# Patient Record
Sex: FEMALE | Race: WHITE | NOT HISPANIC OR LATINO
[De-identification: names, ages, dates, MRNs, and addresses within clinical notes are randomized per-mention and may not be internally consistent; named-entity substitution may affect disease eponyms.]

---

## 2017-09-05 ENCOUNTER — RESULT REVIEW (OUTPATIENT)
Age: 22
End: 2017-09-05

## 2018-04-24 ENCOUNTER — TRANSCRIPTION ENCOUNTER (OUTPATIENT)
Age: 23
End: 2018-04-24

## 2018-08-10 ENCOUNTER — APPOINTMENT (OUTPATIENT)
Dept: PSYCHIATRY | Facility: CLINIC | Age: 23
End: 2018-08-10

## 2018-08-15 ENCOUNTER — EMERGENCY (EMERGENCY)
Facility: HOSPITAL | Age: 23
LOS: 1 days | Discharge: HOME | End: 2018-08-15
Attending: EMERGENCY MEDICINE
Payer: COMMERCIAL

## 2018-08-15 VITALS
OXYGEN SATURATION: 99 % | HEIGHT: 67 IN | HEART RATE: 91 BPM | RESPIRATION RATE: 18 BRPM | TEMPERATURE: 99 F | DIASTOLIC BLOOD PRESSURE: 71 MMHG | SYSTOLIC BLOOD PRESSURE: 108 MMHG | WEIGHT: 128.09 LBS

## 2018-08-15 LAB — S PYO AG SPEC QL IA: NEGATIVE — SIGNIFICANT CHANGE UP

## 2018-08-15 PROCEDURE — 87081 CULTURE SCREEN ONLY: CPT

## 2018-08-15 PROCEDURE — 87880 STREP A ASSAY W/OPTIC: CPT

## 2018-08-15 PROCEDURE — 99283 EMERGENCY DEPT VISIT LOW MDM: CPT

## 2018-08-15 RX ORDER — HYOSCYAMINE SULFATE 0.13 MG
1 TABLET ORAL
Qty: 12 | Refills: 0
Start: 2018-08-15 | End: 2018-08-16

## 2018-08-15 RX ORDER — HYOSCYAMINE SULFATE 0.13 MG
0.12 TABLET ORAL ONCE
Qty: 0 | Refills: 0 | Status: COMPLETED | OUTPATIENT
Start: 2018-08-15 | End: 2018-08-15

## 2018-08-15 RX ADMIN — Medication 0.12 MILLIGRAM(S): at 10:50

## 2018-08-15 NOTE — ED PROVIDER NOTE - PLAN OF CARE
1.  Stay well hydrated  2.  Advance diet as tolerated  3.  Take Hyoscyamine 0.125mgs every 4 hrs as needed for abdominal pain  4.  Follow up with your PMD in 2-3 days.      Follow up with your gastroenterologist as needed for persistent pain  5.  Return to the ER for worsening pain, nausea/vomiting, persistent diarrhea, fevers/chills or any other concerning symptoms

## 2018-08-15 NOTE — ED ADULT NURSE NOTE - OBJECTIVE STATEMENT
c/o lower abdominal discomfort and loose stools for a few days with a sore throat. Patient appears comfortable, resting in stretcher. Denies any fever, chills, dysuria or hematuria. Tolerating po fluids at home. Patient's mother had strep throat recently.

## 2018-08-15 NOTE — ED ADULT NURSE NOTE - NSIMPLEMENTINTERV_GEN_ALL_ED
Implemented All Universal Safety Interventions:  [place] to call system. Call bell, personal items and telephone within reach. Instruct patient to call for assistance. Room bathroom lighting operational. Non-slip footwear when patient is off stretcher. Physically safe environment: no spills, clutter or unnecessary equipment. Stretcher in lowest position, wheels locked, appropriate side rails in place.

## 2018-08-15 NOTE — ED PROVIDER NOTE - PHYSICAL EXAMINATION
Gen: AAO x 3, NAD  Skin: No rashes or lesions  HEENT: NC/AT, PERRLA, EOMI, MMM.  pink mucose.  no exudates.  no lymphadenopathy  Resp: unlabored CTAB  Cardiac: rrr s1s2, no murmurs, rubs or gallops  GI: ND, +BS, Soft, NT  Ext: no pedal edema, FROM in all extremities  Neuro: no focal deficits

## 2018-08-15 NOTE — ED PROVIDER NOTE - CARE PLAN
Principal Discharge DX:	Generalized abdominal pain  Assessment and plan of treatment:	1.  Stay well hydrated  2.  Advance diet as tolerated  3.  Take Hyoscyamine 0.125mgs every 4 hrs as needed for abdominal pain  4.  Follow up with your PMD in 2-3 days.      Follow up with your gastroenterologist as needed for persistent pain  5.  Return to the ER for worsening pain, nausea/vomiting, persistent diarrhea, fevers/chills or any other concerning symptoms  Secondary Diagnosis:	Diarrhea

## 2018-08-15 NOTE — ED PROVIDER NOTE - ATTENDING CONTRIBUTION TO CARE
23 y/o female with the above documented history and HPI who on exam appears very well and comfortable. VSs noted, sclerae anicteric, MM's moist, oropharynx s/ erythema, exudate or edema, neck supple, lungs CTA, cardiac sounds s/ audible m/r/g, abdomen soft c/ diffuse generalized discomfort s/ focality, rebound or guarding, extremities s/ asymmetry, skin s/ rash or jaundice and neurologically intact. There is nothing clinically evident to suggest any acute or emergent process; e.g., obstruction, perforation, ischemia, Ao catastrophe, ruptured ectopic, torsion, "-itis" in need of imaging or extra-abdominal issue, be it supra-diaphragmatic or retroperitoneal. A Rapid Strep has been obtained at the request of her mother who had Strep as did her . We will follow the result and treat accordingly. Anticipate DC.

## 2018-10-01 ENCOUNTER — RESULT REVIEW (OUTPATIENT)
Age: 23
End: 2018-10-01

## 2020-01-28 ENCOUNTER — RESULT REVIEW (OUTPATIENT)
Age: 25
End: 2020-01-28

## 2021-02-27 ENCOUNTER — APPOINTMENT (OUTPATIENT)
Dept: OBGYN | Facility: CLINIC | Age: 26
End: 2021-02-27
Payer: COMMERCIAL

## 2021-02-27 DIAGNOSIS — N89.8 OTHER SPECIFIED NONINFLAMMATORY DISORDERS OF VAGINA: ICD-10-CM

## 2021-02-27 PROCEDURE — 99212 OFFICE O/P EST SF 10 MIN: CPT | Mod: 95

## 2021-03-09 ENCOUNTER — APPOINTMENT (OUTPATIENT)
Dept: OBGYN | Facility: CLINIC | Age: 26
End: 2021-03-09
Payer: COMMERCIAL

## 2021-03-09 VITALS
SYSTOLIC BLOOD PRESSURE: 120 MMHG | WEIGHT: 135 LBS | HEIGHT: 67 IN | BODY MASS INDEX: 21.19 KG/M2 | DIASTOLIC BLOOD PRESSURE: 80 MMHG

## 2021-03-09 DIAGNOSIS — Z87.898 PERSONAL HISTORY OF OTHER SPECIFIED CONDITIONS: ICD-10-CM

## 2021-03-09 DIAGNOSIS — F98.8 OTHER SPECIFIED BEHAVIORAL AND EMOTIONAL DISORDERS WITH ONSET USUALLY OCCURRING IN CHILDHOOD AND ADOLESCENCE: ICD-10-CM

## 2021-03-09 DIAGNOSIS — F41.9 ANXIETY DISORDER, UNSPECIFIED: ICD-10-CM

## 2021-03-09 DIAGNOSIS — F32.9 ANXIETY DISORDER, UNSPECIFIED: ICD-10-CM

## 2021-03-09 LAB
BILIRUB UR QL STRIP: NORMAL
CLARITY UR: CLEAR
COLLECTION METHOD: NORMAL
GLUCOSE UR-MCNC: NORMAL
HCG UR QL: 0.2 EU/DL
HGB UR QL STRIP.AUTO: NORMAL
KETONES UR-MCNC: NORMAL
LEUKOCYTE ESTERASE UR QL STRIP: NORMAL
NITRITE UR QL STRIP: NORMAL
PH UR STRIP: 7
PROT UR STRIP-MCNC: NORMAL
SP GR UR STRIP: 1.02

## 2021-03-09 PROCEDURE — 81003 URINALYSIS AUTO W/O SCOPE: CPT | Mod: QW

## 2021-03-09 PROCEDURE — 99395 PREV VISIT EST AGE 18-39: CPT | Mod: 25

## 2021-03-09 PROCEDURE — 99213 OFFICE O/P EST LOW 20 MIN: CPT | Mod: 25

## 2021-03-09 PROCEDURE — 99072 ADDL SUPL MATRL&STAF TM PHE: CPT

## 2021-03-09 RX ORDER — METHYLPHENIDATE HYDROCHLORIDE 5 MG/1
TABLET ORAL
Refills: 0 | Status: ACTIVE | COMMUNITY

## 2021-03-09 RX ORDER — LAMOTRIGINE 25 MG/1
TABLET ORAL
Refills: 0 | Status: ACTIVE | COMMUNITY

## 2021-03-31 LAB
C TRACH RRNA SPEC QL NAA+PROBE: NOT DETECTED
CANDIDA VAG CYTO: NOT DETECTED
CYTOLOGY CVX/VAG DOC THIN PREP: NORMAL
G VAGINALIS+PREV SP MTYP VAG QL MICRO: NOT DETECTED
N GONORRHOEA RRNA SPEC QL NAA+PROBE: NOT DETECTED
SOURCE AMPLIFICATION: NORMAL
T VAGINALIS VAG QL WET PREP: NOT DETECTED
URINE CYTOLOGY: NORMAL

## 2021-04-15 PROBLEM — N89.8 VAGINAL ODOR: Status: ACTIVE | Noted: 2021-03-09

## 2021-04-15 NOTE — HISTORY OF PRESENT ILLNESS
[FreeTextEntry1] : 25 yr okd who c/o or urinary frequency and dysuria. No back pain or fevers and had taken 5 days of macrobid and felt much better but still having some dysuria and frequency.  Pt also had vaginal odor but states that got better on abx.  pt has appt for ua, ucx this monday.  \par \par

## 2021-04-15 NOTE — PLAN
[FreeTextEntry1] : Pt given options to wait and hydrate more and give it more time to see if resolved or take 2 more days of macrobid.  Pt prefers to take for 2 more days and will give ua, ucs sample.  Possible resistence d/w pt and pt also has appt with me in office in 2 weeks\par \par 15 min

## 2021-05-16 ENCOUNTER — RX RENEWAL (OUTPATIENT)
Age: 26
End: 2021-05-16

## 2021-05-28 ENCOUNTER — TRANSCRIPTION ENCOUNTER (OUTPATIENT)
Age: 26
End: 2021-05-28

## 2021-07-19 DIAGNOSIS — B00.9 HERPESVIRAL INFECTION, UNSPECIFIED: ICD-10-CM

## 2021-09-14 ENCOUNTER — APPOINTMENT (OUTPATIENT)
Dept: OBGYN | Facility: CLINIC | Age: 26
End: 2021-09-14
Payer: COMMERCIAL

## 2021-09-14 PROCEDURE — 99443: CPT | Mod: 95

## 2021-09-16 NOTE — HISTORY OF PRESENT ILLNESS
[FreeTextEntry1] : Pt c/o of urinary frequency and dysuria and c/o of urinary frequency increased.  No vaginal itching or burning or foul odor.  Pt wish same partner x 6 years.  Pt denies fever or back pain and reports hx of UTIs throughout the year and not always postcoital.  Pt c/ of occ severe constipation\par \par UTI/Constipation\par \par 1. macrobid x 7days\par 2. rx urelle\par 3. f/u with urologist -possible overactive bladder\par 4. bowel hygeine d/w pt and increase hydration and fiber and exercise and options for constipation d/w pt

## 2022-02-10 ENCOUNTER — RX RENEWAL (OUTPATIENT)
Age: 27
End: 2022-02-10

## 2022-03-30 ENCOUNTER — TRANSCRIPTION ENCOUNTER (OUTPATIENT)
Age: 27
End: 2022-03-30

## 2022-04-04 ENCOUNTER — APPOINTMENT (OUTPATIENT)
Dept: PAIN MANAGEMENT | Facility: CLINIC | Age: 27
End: 2022-04-04

## 2022-05-02 DIAGNOSIS — Z01.419 ENCOUNTER FOR GYNECOLOGICAL EXAMINATION (GENERAL) (ROUTINE) W/OUT ABNORMAL FINDINGS: ICD-10-CM

## 2022-05-03 ENCOUNTER — APPOINTMENT (OUTPATIENT)
Dept: OBGYN | Facility: CLINIC | Age: 27
End: 2022-05-03

## 2022-06-03 ENCOUNTER — APPOINTMENT (OUTPATIENT)
Dept: OBGYN | Facility: CLINIC | Age: 27
End: 2022-06-03

## 2022-08-02 ENCOUNTER — ASOB RESULT (OUTPATIENT)
Age: 27
End: 2022-08-02

## 2022-08-02 ENCOUNTER — APPOINTMENT (OUTPATIENT)
Dept: OBGYN | Facility: CLINIC | Age: 27
End: 2022-08-02

## 2022-08-02 VITALS
WEIGHT: 135 LBS | SYSTOLIC BLOOD PRESSURE: 115 MMHG | BODY MASS INDEX: 21.19 KG/M2 | DIASTOLIC BLOOD PRESSURE: 69 MMHG | HEIGHT: 67 IN

## 2022-08-02 DIAGNOSIS — Z80.3 FAMILY HISTORY OF MALIGNANT NEOPLASM OF BREAST: ICD-10-CM

## 2022-08-02 DIAGNOSIS — N32.81 OVERACTIVE BLADDER: ICD-10-CM

## 2022-08-02 DIAGNOSIS — Z30.9 ENCOUNTER FOR CONTRACEPTIVE MANAGEMENT, UNSPECIFIED: ICD-10-CM

## 2022-08-02 DIAGNOSIS — R39.9 UNSPECIFIED SYMPTOMS AND SIGNS INVOLVING THE GENITOURINARY SYSTEM: ICD-10-CM

## 2022-08-02 PROCEDURE — 99395 PREV VISIT EST AGE 18-39: CPT

## 2022-08-02 PROCEDURE — 76830 TRANSVAGINAL US NON-OB: CPT

## 2022-08-02 RX ORDER — NITROFURANTOIN MACROCRYSTALS 50 MG/1
50 CAPSULE ORAL
Qty: 30 | Refills: 1 | Status: ACTIVE | COMMUNITY
Start: 2022-08-02 | End: 1900-01-01

## 2022-08-02 RX ORDER — METHYLPHENIDATE HYDROCHLORIDE 18 MG/1
18 TABLET, EXTENDED RELEASE ORAL
Qty: 30 | Refills: 0 | Status: ACTIVE | COMMUNITY
Start: 2022-07-16

## 2022-08-02 RX ORDER — NORETHINDRONE ACETATE AND ETHINYL ESTRADIOL AND FERROUS FUMARATE 1.5-30(21)
1.5-3 KIT ORAL DAILY
Qty: 3 | Refills: 0 | Status: DISCONTINUED | COMMUNITY
End: 2022-08-02

## 2022-08-02 RX ORDER — SOLIFENACIN SUCCINATE 5 MG/1
5 TABLET ORAL
Refills: 0 | Status: ACTIVE | COMMUNITY
Start: 2022-08-02

## 2022-08-03 ENCOUNTER — APPOINTMENT (OUTPATIENT)
Dept: OBGYN | Facility: CLINIC | Age: 27
End: 2022-08-03

## 2022-08-03 LAB
C TRACH RRNA SPEC QL NAA+PROBE: NOT DETECTED
N GONORRHOEA RRNA SPEC QL NAA+PROBE: NOT DETECTED
SOURCE AMPLIFICATION: NORMAL

## 2022-08-04 LAB — BACTERIA UR CULT: NORMAL

## 2022-08-26 ENCOUNTER — RX RENEWAL (OUTPATIENT)
Age: 27
End: 2022-08-26

## 2022-11-09 DIAGNOSIS — N76.0 ACUTE VAGINITIS: ICD-10-CM

## 2022-11-09 RX ORDER — FLUCONAZOLE 150 MG/1
150 TABLET ORAL DAILY
Qty: 3 | Refills: 0 | Status: ACTIVE | COMMUNITY
Start: 2022-11-09 | End: 1900-01-01

## 2022-12-02 ENCOUNTER — APPOINTMENT (OUTPATIENT)
Dept: INTERNAL MEDICINE | Facility: CLINIC | Age: 27
End: 2022-12-02

## 2023-02-01 ENCOUNTER — NON-APPOINTMENT (OUTPATIENT)
Age: 28
End: 2023-02-01

## 2023-03-22 ENCOUNTER — APPOINTMENT (OUTPATIENT)
Dept: OBGYN | Facility: CLINIC | Age: 28
End: 2023-03-22
Payer: COMMERCIAL

## 2023-03-22 VITALS — DIASTOLIC BLOOD PRESSURE: 69 MMHG | HEART RATE: 80 BPM | SYSTOLIC BLOOD PRESSURE: 109 MMHG

## 2023-03-22 DIAGNOSIS — N91.5 OLIGOMENORRHEA, UNSPECIFIED: ICD-10-CM

## 2023-03-22 LAB
BILIRUB UR QL STRIP: NORMAL
CLARITY UR: CLEAR
COLLECTION METHOD: NORMAL
GLUCOSE UR-MCNC: NORMAL
HCG UR QL: 0.2 EU/DL
HCG UR QL: NEGATIVE
HGB UR QL STRIP.AUTO: NORMAL
KETONES UR-MCNC: NORMAL
LEUKOCYTE ESTERASE UR QL STRIP: NORMAL
NITRITE UR QL STRIP: NORMAL
PH UR STRIP: 6
PROT UR STRIP-MCNC: NORMAL
QUALITY CONTROL: YES
SP GR UR STRIP: 1.01

## 2023-03-22 PROCEDURE — 99214 OFFICE O/P EST MOD 30 MIN: CPT

## 2023-03-22 PROCEDURE — 36415 COLL VENOUS BLD VENIPUNCTURE: CPT

## 2023-03-22 PROCEDURE — 81025 URINE PREGNANCY TEST: CPT

## 2023-03-22 RX ORDER — FLUCONAZOLE 150 MG/1
150 TABLET ORAL DAILY
Qty: 3 | Refills: 1 | Status: ACTIVE | COMMUNITY
Start: 2023-03-22 | End: 1900-01-01

## 2023-03-22 RX ORDER — CLOTRIMAZOLE AND BETAMETHASONE DIPROPIONATE 10; .5 MG/G; MG/G
1-0.05 CREAM TOPICAL
Qty: 1 | Refills: 0 | Status: ACTIVE | COMMUNITY
Start: 2023-03-22 | End: 1900-01-01

## 2023-03-27 LAB
25(OH)D3 SERPL-MCNC: 46.5 NG/ML
A VAGINAE DNA VAG QL NAA+PROBE: NORMAL
ALBUMIN SERPL ELPH-MCNC: 4 G/DL
ALP BLD-CCNC: 39 U/L
ALT SERPL-CCNC: 16 U/L
ANION GAP SERPL CALC-SCNC: 14 MMOL/L
AST SERPL-CCNC: 20 U/L
BACTERIA UR CULT: NORMAL
BASOPHILS # BLD AUTO: 0.04 K/UL
BASOPHILS NFR BLD AUTO: 0.4 %
BILIRUB SERPL-MCNC: 0.2 MG/DL
BUN SERPL-MCNC: 16 MG/DL
BVAB2 DNA VAG QL NAA+PROBE: NORMAL
C KRUSEI DNA VAG QL NAA+PROBE: NEGATIVE
C TRACH RRNA SPEC QL NAA+PROBE: NEGATIVE
CALCIUM SERPL-MCNC: 9.2 MG/DL
CHLORIDE SERPL-SCNC: 101 MMOL/L
CHOLEST SERPL-MCNC: 149 MG/DL
CO2 SERPL-SCNC: 25 MMOL/L
CREAT SERPL-MCNC: 0.75 MG/DL
EGFR: 112 ML/MIN/1.73M2
EOSINOPHIL # BLD AUTO: 0.39 K/UL
EOSINOPHIL NFR BLD AUTO: 4.3 %
ESTIMATED AVERAGE GLUCOSE: 105 MG/DL
GLUCOSE SERPL-MCNC: 63 MG/DL
HBA1C MFR BLD HPLC: 5.3 %
HCT VFR BLD CALC: 41.1 %
HDLC SERPL-MCNC: 57 MG/DL
HGB BLD-MCNC: 13.1 G/DL
IMM GRANULOCYTES NFR BLD AUTO: 0.1 %
LDLC SERPL CALC-MCNC: 79 MG/DL
LYMPHOCYTES # BLD AUTO: 3.23 K/UL
LYMPHOCYTES NFR BLD AUTO: 36 %
MAN DIFF?: NORMAL
MCHC RBC-ENTMCNC: 30.9 PG
MCHC RBC-ENTMCNC: 31.9 GM/DL
MCV RBC AUTO: 96.9 FL
MEGA1 DNA VAG QL NAA+PROBE: NORMAL
MONOCYTES # BLD AUTO: 0.78 K/UL
MONOCYTES NFR BLD AUTO: 8.7 %
N GONORRHOEA RRNA SPEC QL NAA+PROBE: NEGATIVE
NEUTROPHILS # BLD AUTO: 4.52 K/UL
NEUTROPHILS NFR BLD AUTO: 50.5 %
NONHDLC SERPL-MCNC: 92 MG/DL
PLATELET # BLD AUTO: 233 K/UL
POTASSIUM SERPL-SCNC: 4.3 MMOL/L
PROLACTIN SERPL-MCNC: 14.9 NG/ML
PROT SERPL-MCNC: 6.2 G/DL
RBC # BLD: 4.24 M/UL
RBC # FLD: 13.6 %
SODIUM SERPL-SCNC: 141 MMOL/L
T VAGINALIS RRNA SPEC QL NAA+PROBE: NEGATIVE
T4 FREE SERPL-MCNC: 2.2 NG/DL
TRIGL SERPL-MCNC: 65 MG/DL
TSH SERPL-ACNC: 0.88 UIU/ML
VIT B12 SERPL-MCNC: 704 PG/ML
WBC # FLD AUTO: 8.97 K/UL

## 2023-04-14 ENCOUNTER — APPOINTMENT (OUTPATIENT)
Dept: ULTRASOUND IMAGING | Facility: CLINIC | Age: 28
End: 2023-04-14

## 2023-04-17 ENCOUNTER — NON-APPOINTMENT (OUTPATIENT)
Age: 28
End: 2023-04-17

## 2023-05-15 ENCOUNTER — APPOINTMENT (OUTPATIENT)
Dept: ULTRASOUND IMAGING | Facility: CLINIC | Age: 28
End: 2023-05-15

## 2023-05-26 ENCOUNTER — EMERGENCY (EMERGENCY)
Facility: HOSPITAL | Age: 28
LOS: 1 days | Discharge: HOME | End: 2023-05-26
Attending: EMERGENCY MEDICINE | Admitting: EMERGENCY MEDICINE
Payer: COMMERCIAL

## 2023-05-26 PROCEDURE — 99283 EMERGENCY DEPT VISIT LOW MDM: CPT

## 2023-05-26 NOTE — ED PROVIDER NOTE - NSFOLLOWUPINSTRUCTIONS_ED_ALL_ED_FT
Please follow up with [facility] in 1 week for review of blood testing and further treatment.  You may return at any time if you would like to begin post exposure prophylaxis.

## 2023-05-26 NOTE — ED PROVIDER NOTE - PHYSICAL EXAMINATION
Physical exam  Well-appearing female in no respiratory distress  Vital signs stable  Clear to auscultation bilaterally  S1-S2 no murmurs rubs or gallops  Eyes pupils equal and reactive to light, no abrasions

## 2023-05-26 NOTE — ED PROVIDER NOTE - CLINICAL SUMMARY MEDICAL DECISION MAKING FREE TEXT BOX
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

## 2023-05-26 NOTE — ED PROVIDER NOTE - OBJECTIVE STATEMENT
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  Patient washed her eyes immediately.  Patient has no symptoms.  Patient here for blood-borne exposure.

## 2023-05-26 NOTE — ED ADULT NURSE NOTE - OBJECTIVE STATEMENT
ADAM RN: pt. received to [facility] 20A A&Ox4 ambulatory presenting s/p work exposure. pt. endorses cleaning out a patients ear when blood got in her eye. pt. endorses washing it out at the eye station. NAD noted. respirations even and unlabored. labs drawn as per protocol.

## 2023-05-26 NOTE — ED ADULT NURSE NOTE - NSFALLUNIVINTERV_ED_ALL_ED
Bed/Stretcher in lowest position, wheels locked, appropriate side rails in place/Call bell, personal items and telephone in reach/Instruct patient to call for assistance before getting out of bed/chair/stretcher/Non-slip footwear applied when patient is off stretcher/[place] to call system/Physically safe environment - no spills, clutter or unnecessary equipment/Purposeful proactive rounding/Room/bathroom lighting operational, light cord in reach

## 2023-05-26 NOTE — ED PROVIDER NOTE - PATIENT PORTAL LINK FT
You can access the FollowMyHealth Patient Portal offered by [facility] by registering at the following website: [email or web address]. By joining [employer]’s FollowMyHealth portal, you will also be able to view your health information using other applications (apps) compatible with our system.

## 2023-06-19 NOTE — ED ADULT TRIAGE NOTE - WEIGHT METHOD
Per Dr Lopez, \"The correct dose would be 15 mg PO q week.\"    New Rx submitted with correct sig. Refills to get through until next appt due Jan 2024.   stated

## 2023-08-01 NOTE — PHYSICAL EXAM
[Chaperone Present] : A chaperone was present in the examining room during all aspects of the physical examination [FreeTextEntry1] : General: Well, appearing. Alert and orientated. No acute distress HEENT: Normocephalic, atraumatic and extraocular muscles appear to be intact  Neck: Full range of motion, no obvious lymphadenopathy, deformities, or masses noted  Respiratory: Speaking in full sentences comfortably, normal work of breathing and no cough during visit Musculoskeletal: active full range of motion in extremities  Extremities: No upper extremity edema noted Skin: no obvious rash or skin lesions Neuro: Orientated X 3, speech is fluent, normal rate Psych: Normal mood and affect    [Tenderness] : ~T no ~M abdominal tenderness observed [Distended] : not distended [Labia Majora] : were normal [Normal Appearance] : general appearance was normal [Normal] : no abnormalities [Exam Deferred] : was deferred

## 2023-08-02 ENCOUNTER — APPOINTMENT (OUTPATIENT)
Dept: UROGYNECOLOGY | Facility: CLINIC | Age: 28
End: 2023-08-02

## 2023-09-06 ENCOUNTER — APPOINTMENT (OUTPATIENT)
Dept: FAMILY MEDICINE | Facility: CLINIC | Age: 28
End: 2023-09-06

## 2023-09-13 ENCOUNTER — TRANSCRIPTION ENCOUNTER (OUTPATIENT)
Age: 28
End: 2023-09-13

## 2023-09-13 ENCOUNTER — APPOINTMENT (OUTPATIENT)
Dept: FAMILY MEDICINE | Facility: CLINIC | Age: 28
End: 2023-09-13
Payer: COMMERCIAL

## 2023-09-13 VITALS
HEIGHT: 67 IN | BODY MASS INDEX: 20.88 KG/M2 | OXYGEN SATURATION: 100 % | TEMPERATURE: 98.1 F | DIASTOLIC BLOOD PRESSURE: 69 MMHG | WEIGHT: 133 LBS | SYSTOLIC BLOOD PRESSURE: 114 MMHG | HEART RATE: 65 BPM | RESPIRATION RATE: 14 BRPM

## 2023-09-13 DIAGNOSIS — D68.318 OTHER HEMORRHAGIC DISORDER DUE TO INTRINSIC CIRCULATING ANTICOAGULANTS, ANTIBODIES, OR INHIBITORS: ICD-10-CM

## 2023-09-13 DIAGNOSIS — K58.9 IRRITABLE BOWEL SYNDROME W/OUT DIARRHEA: ICD-10-CM

## 2023-09-13 DIAGNOSIS — K59.00 CONSTIPATION, UNSPECIFIED: ICD-10-CM

## 2023-09-13 DIAGNOSIS — Z82.49 FAMILY HISTORY OF ISCHEMIC HEART DISEASE AND OTHER DISEASES OF THE CIRCULATORY SYSTEM: ICD-10-CM

## 2023-09-13 PROCEDURE — 99395 PREV VISIT EST AGE 18-39: CPT | Mod: 25

## 2023-09-13 PROCEDURE — 36415 COLL VENOUS BLD VENIPUNCTURE: CPT

## 2023-09-14 LAB
ALBUMIN SERPL ELPH-MCNC: 4.5 G/DL
ALP BLD-CCNC: 37 U/L
ALT SERPL-CCNC: 13 U/L
ANION GAP SERPL CALC-SCNC: 11 MMOL/L
APPEARANCE: CLEAR
AST SERPL-CCNC: 18 U/L
BACTERIA: NEGATIVE /HPF
BASOPHILS # BLD AUTO: 0.05 K/UL
BASOPHILS NFR BLD AUTO: 0.7 %
BILIRUB SERPL-MCNC: 0.2 MG/DL
BILIRUBIN URINE: NEGATIVE
BLOOD URINE: ABNORMAL
BUN SERPL-MCNC: 13 MG/DL
CALCIUM SERPL-MCNC: 9.7 MG/DL
CAST: 0 /LPF
CHLORIDE SERPL-SCNC: 104 MMOL/L
CHOLEST SERPL-MCNC: 182 MG/DL
CO2 SERPL-SCNC: 25 MMOL/L
COLOR: YELLOW
CREAT SERPL-MCNC: 0.66 MG/DL
EGFR: 123 ML/MIN/1.73M2
EOSINOPHIL # BLD AUTO: 0.15 K/UL
EOSINOPHIL NFR BLD AUTO: 2 %
EPITHELIAL CELLS: 2 /HPF
ESTIMATED AVERAGE GLUCOSE: 108 MG/DL
GLUCOSE QUALITATIVE U: NEGATIVE MG/DL
GLUCOSE SERPL-MCNC: 68 MG/DL
HBA1C MFR BLD HPLC: 5.4 %
HCT VFR BLD CALC: 44.1 %
HCV AB SER QL: NONREACTIVE
HCV S/CO RATIO: 0.04 S/CO
HDLC SERPL-MCNC: 65 MG/DL
HGB BLD-MCNC: 14.4 G/DL
HIV1+2 AB SPEC QL IA.RAPID: NONREACTIVE
IMM GRANULOCYTES NFR BLD AUTO: 0.3 %
KETONES URINE: NEGATIVE MG/DL
LDLC SERPL CALC-MCNC: 105 MG/DL
LEUKOCYTE ESTERASE URINE: NEGATIVE
LYMPHOCYTES # BLD AUTO: 2.46 K/UL
LYMPHOCYTES NFR BLD AUTO: 33.5 %
MAN DIFF?: NORMAL
MCHC RBC-ENTMCNC: 31.9 PG
MCHC RBC-ENTMCNC: 32.7 GM/DL
MCV RBC AUTO: 97.6 FL
MICROSCOPIC-UA: NORMAL
MONOCYTES # BLD AUTO: 0.57 K/UL
MONOCYTES NFR BLD AUTO: 7.8 %
NEUTROPHILS # BLD AUTO: 4.09 K/UL
NEUTROPHILS NFR BLD AUTO: 55.7 %
NITRITE URINE: NEGATIVE
NONHDLC SERPL-MCNC: 116 MG/DL
PH URINE: 7
PLATELET # BLD AUTO: 263 K/UL
POTASSIUM SERPL-SCNC: 4.5 MMOL/L
PROT SERPL-MCNC: 6.9 G/DL
PROTEIN URINE: NEGATIVE MG/DL
RBC # BLD: 4.52 M/UL
RBC # FLD: 13.4 %
RED BLOOD CELLS URINE: 5 /HPF
SODIUM SERPL-SCNC: 140 MMOL/L
SPECIFIC GRAVITY URINE: 1.01
T3 SERPL-MCNC: 129 NG/DL
TRIGL SERPL-MCNC: 55 MG/DL
TSH SERPL-ACNC: 1.58 UIU/ML
UROBILINOGEN URINE: 0.2 MG/DL
WBC # FLD AUTO: 7.34 K/UL
WHITE BLOOD CELLS URINE: 0 /HPF

## 2023-09-22 ENCOUNTER — APPOINTMENT (OUTPATIENT)
Dept: OBGYN | Facility: CLINIC | Age: 28
End: 2023-09-22
Payer: COMMERCIAL

## 2023-09-22 VITALS
BODY MASS INDEX: 21.19 KG/M2 | HEIGHT: 67 IN | SYSTOLIC BLOOD PRESSURE: 117 MMHG | DIASTOLIC BLOOD PRESSURE: 71 MMHG | WEIGHT: 135 LBS

## 2023-09-22 DIAGNOSIS — Z01.419 ENCOUNTER FOR GYNECOLOGICAL EXAMINATION (GENERAL) (ROUTINE) W/OUT ABNORMAL FINDINGS: ICD-10-CM

## 2023-09-22 PROCEDURE — 99395 PREV VISIT EST AGE 18-39: CPT

## 2023-09-25 ENCOUNTER — EMERGENCY (EMERGENCY)
Facility: HOSPITAL | Age: 28
LOS: 0 days | Discharge: HOME | End: 2023-09-25
Attending: STUDENT IN AN ORGANIZED HEALTH CARE EDUCATION/TRAINING PROGRAM
Payer: OTHER MISCELLANEOUS

## 2023-09-25 VITALS
DIASTOLIC BLOOD PRESSURE: 83 MMHG | OXYGEN SATURATION: 97 % | TEMPERATURE: 98 F | HEART RATE: 76 BPM | RESPIRATION RATE: 18 BRPM | WEIGHT: 134.92 LBS | HEIGHT: 67 IN | SYSTOLIC BLOOD PRESSURE: 129 MMHG
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
## 2023-09-25 DIAGNOSIS — S61.231A PUNCTURE WOUND WITHOUT FOREIGN BODY OF LEFT INDEX FINGER WITHOUT DAMAGE TO NAIL, INITIAL ENCOUNTER: ICD-10-CM

## 2023-09-25 DIAGNOSIS — Z88.2 ALLERGY STATUS TO SULFONAMIDES: ICD-10-CM
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
## 2023-09-25 PROCEDURE — 99284 EMERGENCY DEPT VISIT MOD MDM: CPT

## 2023-09-25 RX ORDER — ONDANSETRON 8 MG/1
1 TABLET, FILM COATED ORAL
Qty: 15 | Refills: 0
Start: 2023-09-25 | End: 2023-09-29

## 2023-09-25 RX ORDER — EMTRICITABINE AND TENOFOVIR DISOPROXIL FUMARATE 200; 300 MG/1; MG/1
1 TABLET, FILM COATED ORAL ONCE
Refills: 0 | Status: COMPLETED | OUTPATIENT
Start: 2023-09-25 | End: 2023-09-25

## 2023-09-25 RX ORDER — RALTEGRAVIR 400 MG/1
400 TABLET, FILM COATED ORAL ONCE
Refills: 0 | Status: COMPLETED | OUTPATIENT
Start: 2023-09-25 | End: 2023-09-25

## 2023-09-25 RX ORDER — ONDANSETRON 8 MG/1
4 TABLET, FILM COATED ORAL ONCE
Refills: 0 | Status: COMPLETED | OUTPATIENT
Start: 2023-09-25 | End: 2023-09-25

## 2023-09-25 RX ADMIN — EMTRICITABINE AND TENOFOVIR DISOPROXIL FUMARATE 1 TABLET(S): 200; 300 TABLET, FILM COATED ORAL at 18:27

## 2023-09-25 RX ADMIN — RALTEGRAVIR 400 MILLIGRAM(S): 400 TABLET, FILM COATED ORAL at 18:27

## 2023-09-25 NOTE — ED PROVIDER NOTE - CLINICAL SUMMARY MEDICAL DECISION MAKING FREE TEXT BOX
27 y/o female with no PMH here with needlestick injury to the left 2nd digit.   Will check basic labs including HIV
Pt lives in pvt house with son 3 steps to enter

## 2023-09-25 NOTE — ED PROVIDER NOTE - PROGRESS NOTE DETAILS
Dr Isaac (ID) at bedside, will set up pt with an hiv specialist, employee postexposure packet completed

## 2023-09-25 NOTE — ED ADULT NURSE NOTE - NSFALLUNIVINTERV_ED_ALL_ED
Bed/Stretcher in lowest position, wheels locked, appropriate side rails in place/Call bell, personal items and telephone in reach/Instruct patient to call for assistance before getting out of bed/chair/stretcher/Non-slip footwear applied when patient is off stretcher/[place] to call system/Physically safe environment - no spills, clutter or unnecessary equipment/Purposeful proactive rounding/Room/bathroom lighting operational, light cord in reach

## 2023-09-25 NOTE — ED ADULT NURSE NOTE - OBJECTIVE STATEMENT
Pt AOx4 and ambulatory with steady gait. Pt c/o needle stick today while doing blood work on a pt that was positive for HIV and HPV. Pt states she took her gloved off and noticed blood on her finger with a needle prick. Pt denies SOB, fever/chills, N/V/D, HA/dizziness, abdominal pain, chest pain, or dysuria. Pt denies any pertinent medical hx.

## 2023-09-25 NOTE — ED PROVIDER NOTE - OBJECTIVE STATEMENT
27 y/o female with no PMH here with needle stick injury to the left 2nd digit. 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 Pt states possibly has a cut by the cuticle and not sure if the blood touch it. Pt cleaned it soap and water. Pt is vaccinated for hep  b and up to date on tetanus. 27 y/o female with no PMH here with needle stick injury to the left 2nd digit. 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  Pt states possibly she has stuck herself while drawing blood. Pt cleaned it soap and water. Pt is vaccinated for hep  b and up to date on tetanus. Pt denies being pregnant.

## 2023-09-25 NOTE — ED PROVIDER NOTE - PATIENT PORTAL LINK FT
You can access the FollowMyHealth Patient Portal offered by [facility] by registering at the following website: [email or web address]. By joining [email or web address]’s FollowMyHealth portal, you will also be able to view your health information using other applications (apps) compatible with our system.

## 2023-09-25 NOTE — ED PROVIDER NOTE - ATTENDING APP SHARED VISIT CONTRIBUTION OF CARE
Name band;
I performed a history and physical examination of the patient and discussed his management with the PA.  I reviewed the PA's note and agree with the documented findings and plan of care.

## 2023-09-25 NOTE — ED PROVIDER NOTE - PHYSICAL EXAMINATION
GEN: Awake, alert, interactive, NAD.  HEAD AND NECK: NC/AT. Airway patent. Neck supple.   EYES:  Clear b/l.   ENT: Moist mucus membranes.   CARDIAC: Regular rate, regular rhythm. No evident pedal edema.    RESP/CHEST: Normal respiratory effort with no use of accessory muscles or retractions. Clear throughout on auscultation.  EXTREMITIES: LUE: NO deformity, (+) FROm of the left 2nd digit,  (-) open wound noted, (+) pulses intact. Moving all extremities with no apparent deformities.   SKIN: Warm, dry, intact normal color. No rash.   NEURO: AOx3,no focal deficits.   PSYCH: Appropriate mood and affect. GEN: Awake, alert, interactive, NAD.  HEAD AND NECK: NC/AT. Airway patent. Neck supple.   EYES:  Clear b/l.   ENT: Moist mucus membranes.   CARDIAC: Regular rate, regular rhythm. No evident pedal edema.    RESP/CHEST: Normal respiratory effort with no use of accessory muscles or retractions. Clear throughout on auscultation.  EXTREMITIES: LUE: NO deformity, (+) FROM of the left 2nd digit, (+) pinpoint puncture wound to the 2nd digit DIP, (-) open wound noted, (+) pulses intact. Moving all extremities with no apparent deformities.   SKIN: Warm, dry, intact normal color. No rash.   NEURO: AOx3,no focal deficits.   PSYCH: Appropriate mood and affect.

## 2023-09-25 NOTE — ED ADULT TRIAGE NOTE - NS ED NURSE BANDS TYPE
Patient Education        Gastroesophageal Reflux Disease (GERD): Care Instructions  Overview     Gastroesophageal reflux disease (GERD) is the backward flow of stomach acid into the esophagus. The esophagus is the tube that leads from your throat to your stomach. A one-way valve prevents the stomach acid from backing up into this tube. But when you have GERD, this valve does not close tightly enough. This can also cause pain and swelling in your esophagus. (This is calledesophagitis.)  If you have mild GERD symptoms including heartburn, you may be able to control the problem with antacids or over-the-counter medicine. You can also make lifestyle changes to help reduce your symptoms. These include changing yourdiet and eating habits, such as not eating late at night and losing weight. Follow-up care is a key part of your treatment and safety. Be sure to make and go to all appointments, and call your doctor if you are having problems. It's also a good idea to know your test results and keep alist of the medicines you take. How can you care for yourself at home?  Take your medicines exactly as prescribed. Call your doctor if you think you are having a problem with your medicine.  Your doctor may recommend over-the-counter medicine. For mild or occasional indigestion, antacids, such as Tums, Gaviscon, Mylanta, or Maalox, may help. Your doctor also may recommend over-the-counter acid reducers, such as famotidine (Pepcid AC), cimetidine (Tagamet HB), or omeprazole (Prilosec). Read and follow all instructions on the label. If you use these medicines often, talk with your doctor.  Change your eating habits. ? It's best to eat several small meals instead of two or three large meals. ? After you eat, wait 2 to 3 hours before you lie down. ? Avoid foods that make your symptoms worse.  These may include chocolate, mint, alcohol, pepper, spicy foods, high-fat foods, or drinks with caffeine in them, such as tea, coffee, virgil, or energy drinks. If your symptoms are worse after you eat a certain food, you may want to stop eating it to see if your symptoms get better.  Do not smoke or chew tobacco. Smoking can make GERD worse. If you need help quitting, talk to your doctor about stop-smoking programs and medicines. These can increase your chances of quitting for good.  If you have GERD symptoms at night, raise the head of your bed 6 to 8 inches by putting the frame on blocks or placing a foam wedge under the head of your mattress. (Adding extra pillows does not work.)   Do not wear tight clothing around your middle.  Lose weight if you need to. Losing just 5 to 10 pounds can help. When should you call for help? Call your doctor now or seek immediate medical care if:     You have new or different belly pain.      Your stools are black and tarlike or have streaks of blood. Watch closely for changes in your health, and be sure to contact your doctor if:     Your symptoms have not improved after 2 days.      Food seems to catch in your throat or chest.   Where can you learn more? Go to https://Beryllium.Confidex. org and sign in to your [employer] account. Enter X247 in the [facility] box to learn more about \"Gastroesophageal Reflux Disease (GERD): Care Instructions. \"     If you do not have an account, please click on the \"Sign Up Now\" link. Current as of: September 8, 2021               Content Version: 13.2  © 2006-2022 [employer]. Care instructions adapted under license by [facility] ([facility]). If you have questions about a medical condition or this instruction, always ask your healthcare professional. [place] [address] any warranty or liability for your use of this information. Patient Education        Learning About Acid-Reducing Medicines  What are they? Acid-reducing medicines can help relieve heartburn and other symptoms of indigestion.  They can help prevent damage to your digestive system from stomachacids. They also are used to treat reflux and ulcer symptoms. These medicines include H2 blockers and proton pump inhibitors (PPIs). They help your stomach make less acid. You can buy them over the counter. Some ofthem also come in prescription strengths. Antacids can also help relieve heartburn symptoms. They reduce the acid that isalready in your stomach. You can buy them over the counter. Which medicine is best for you depends on what is causing your symptoms. How do they work? Acid-reducing medicines work in two ways. H2 blockers and proton pump inhibitors (PPIs) lower the amount of acid your stomach makes. They don't work on the acid that's already there. Antacids work by making stomach juices lessacidic. But your heartburn may come back as your stomach makes more acid. What are some examples? Examples of acid reducers include:  H2 blockers.  Tagamet (cimetidine)   Pepcid (famotidine)  Proton pump inhibitors (PPIs).  Nexium (esomeprazole)   Prevacid (lansoprazole)   Prilosec, Zegerid (omeprazole)   Protonix (pantoprazole)   Aciphex (rabeprazole)  Antacids.  Gaviscon   Mylanta   Maalox   Tums  What are side effects might you have? Many people don't have side effects. And minor side effects might go away aftera while. H2 blockers can cause headaches or make you dizzy. They might cause diarrhea orconstipation. You may have nausea and vomiting. PPIs can cause headaches and diarrhea. Using them for a long time may raiseyour risk for infections or broken bones. Some antacids can cause constipation or diarrhea. The brands vary in theingredients they use. They can have different side effects. If you use too much heartburn medicine, your body may not get enough of someminerals from your food. How can you take these medicines safely? Some H2 blockers and PPIs can affect how other medicines work.  Tell your doctor if you use other medicines. He or she may change the dose or give you adifferent medicine. Many antacids have aspirin in them. Read the label to make sure that you don'ttake too much. Too much aspirin can be harmful. Be safe with medicines. Take your medicines exactly as prescribed. If you take over-the-counter medicine, be sure to read and follow all instructions on the label. Call your doctor if you think you are having a problem with yourmedicine. Check with your doctor or pharmacist before you use any other medicines. This includes over-the-counter medicines. Tell your doctor about all of the medicines, vitamins, herbal products, and supplements you take. Taking somemedicines together can cause problems. Follow-up care is a key part of your treatment and safety. Be sure to make and go to all appointments, and call your doctor if you are having problems. It's also a good idea to know your test results and keep alist of the medicines you take. Where can you learn more? Go to https://Trigeminashania.Corhythm. org and sign in to your [employer] account. Enter [phone] in the [facility] box to learn more about \"Learning About Acid-Reducing Medicines. \"     If you do not have an account, please click on the \"Sign Up Now\" link. Current as of: September 8, 2021               Content Version: 13.2  © 2452-6178 [employer]. Care instructions adapted under license by [facility] ([facility]). If you have questions about a medical condition or this instruction, always ask your healthcare professional. [place] [address] any warranty or liability for your use of this information. Name band;

## 2023-09-25 NOTE — ED ADULT TRIAGE NOTE - CHIEF COMPLAINT QUOTE
pt presents to the ED c/o cut to cuticle and while drawing patient's blood, patients blood was noticed on cut cuticle today at work.

## 2023-09-26 LAB — CYTOLOGY CVX/VAG DOC THIN PREP: NORMAL

## 2023-10-01 ENCOUNTER — NON-APPOINTMENT (OUTPATIENT)
Age: 28
End: 2023-10-01

## 2023-10-03 RX ORDER — EMTRICITABINE AND TENOFOVIR DISOPROXIL FUMARATE 200; 300 MG/1; MG/1
200-300 TABLET, FILM COATED ORAL DAILY
Qty: 21 | Refills: 0 | Status: DISCONTINUED | COMMUNITY
Start: 2023-10-01 | End: 2023-10-03

## 2023-10-06 LAB
ALBUMIN SERPL ELPH-MCNC: 4.5 G/DL
ALP BLD-CCNC: 40 U/L
ALT SERPL-CCNC: 21 U/L
ANION GAP SERPL CALC-SCNC: 11 MMOL/L
AST SERPL-CCNC: 23 U/L
BILIRUB SERPL-MCNC: 0.2 MG/DL
BUN SERPL-MCNC: 11 MG/DL
CALCIUM SERPL-MCNC: 9.3 MG/DL
CHLORIDE SERPL-SCNC: 101 MMOL/L
CK SERPL-CCNC: 149 U/L
CO2 SERPL-SCNC: 26 MMOL/L
CREAT SERPL-MCNC: 0.79 MG/DL
EGFR: 104 ML/MIN/1.73M2
GLUCOSE SERPL-MCNC: 79 MG/DL
HCT VFR BLD CALC: 40.7 %
HGB BLD-MCNC: 13.5 G/DL
HIV1 RNA # SERPL NAA+PROBE: NORMAL
HIV1 RNA # SERPL NAA+PROBE: NORMAL COPIES/ML
MCHC RBC-ENTMCNC: 31.8 PG
MCHC RBC-ENTMCNC: 33.2 GM/DL
MCV RBC AUTO: 96 FL
PLATELET # BLD AUTO: 262 K/UL
POTASSIUM SERPL-SCNC: 4.5 MMOL/L
PROT SERPL-MCNC: 6.5 G/DL
RBC # BLD: 4.24 M/UL
RBC # FLD: 12.8 %
SODIUM SERPL-SCNC: 138 MMOL/L
VIRAL LOAD INTERP: NORMAL
VIRAL LOAD LOG: NORMAL LG COP/ML
WBC # FLD AUTO: 8.93 K/UL

## 2023-10-22 ENCOUNTER — EMERGENCY (EMERGENCY)
Facility: HOSPITAL | Age: 28
LOS: 1 days | Discharge: HOME | End: 2023-10-22
Attending: PERSONAL EMERGENCY RESPONSE ATTENDANT
Payer: COMMERCIAL

## 2023-10-22 VITALS
RESPIRATION RATE: 17 BRPM | TEMPERATURE: 98 F | SYSTOLIC BLOOD PRESSURE: 123 MMHG | DIASTOLIC BLOOD PRESSURE: 78 MMHG | HEART RATE: 73 BPM | OXYGEN SATURATION: 100 %

## 2023-10-22 VITALS
RESPIRATION RATE: 20 BRPM | TEMPERATURE: 99 F | SYSTOLIC BLOOD PRESSURE: 154 MMHG | OXYGEN SATURATION: 98 % | DIASTOLIC BLOOD PRESSURE: 90 MMHG | HEIGHT: 67 IN | WEIGHT: 134.92 LBS | HEART RATE: 77 BPM

## 2023-10-22 LAB
ALBUMIN SERPL ELPH-MCNC: 4.6 G/DL — SIGNIFICANT CHANGE UP (ref 3.3–5)
ALBUMIN SERPL ELPH-MCNC: 4.6 G/DL — SIGNIFICANT CHANGE UP (ref 3.3–5)
ALP SERPL-CCNC: 39 U/L — LOW (ref 40–120)
ALP SERPL-CCNC: 39 U/L — LOW (ref 40–120)
ALT FLD-CCNC: 18 U/L — SIGNIFICANT CHANGE UP (ref 10–45)
ALT FLD-CCNC: 18 U/L — SIGNIFICANT CHANGE UP (ref 10–45)
ANION GAP SERPL CALC-SCNC: 11 MMOL/L — SIGNIFICANT CHANGE UP (ref 5–17)
ANION GAP SERPL CALC-SCNC: 11 MMOL/L — SIGNIFICANT CHANGE UP (ref 5–17)
APTT BLD: 27.9 SEC — SIGNIFICANT CHANGE UP (ref 24.5–35.6)
APTT BLD: 27.9 SEC — SIGNIFICANT CHANGE UP (ref 24.5–35.6)
AST SERPL-CCNC: 20 U/L — SIGNIFICANT CHANGE UP (ref 10–40)
AST SERPL-CCNC: 20 U/L — SIGNIFICANT CHANGE UP (ref 10–40)
BASOPHILS # BLD AUTO: 0.04 K/UL — SIGNIFICANT CHANGE UP (ref 0–0.2)
BASOPHILS # BLD AUTO: 0.04 K/UL — SIGNIFICANT CHANGE UP (ref 0–0.2)
BASOPHILS NFR BLD AUTO: 0.4 % — SIGNIFICANT CHANGE UP (ref 0–2)
BASOPHILS NFR BLD AUTO: 0.4 % — SIGNIFICANT CHANGE UP (ref 0–2)
BILIRUB SERPL-MCNC: 0.4 MG/DL — SIGNIFICANT CHANGE UP (ref 0.2–1.2)
BILIRUB SERPL-MCNC: 0.4 MG/DL — SIGNIFICANT CHANGE UP (ref 0.2–1.2)
BUN SERPL-MCNC: 12 MG/DL — SIGNIFICANT CHANGE UP (ref 7–23)
BUN SERPL-MCNC: 12 MG/DL — SIGNIFICANT CHANGE UP (ref 7–23)
CALCIUM SERPL-MCNC: 9.2 MG/DL — SIGNIFICANT CHANGE UP (ref 8.4–10.5)
CALCIUM SERPL-MCNC: 9.2 MG/DL — SIGNIFICANT CHANGE UP (ref 8.4–10.5)
CHLORIDE SERPL-SCNC: 102 MMOL/L — SIGNIFICANT CHANGE UP (ref 96–108)
CHLORIDE SERPL-SCNC: 102 MMOL/L — SIGNIFICANT CHANGE UP (ref 96–108)
CO2 SERPL-SCNC: 23 MMOL/L — SIGNIFICANT CHANGE UP (ref 22–31)
CO2 SERPL-SCNC: 23 MMOL/L — SIGNIFICANT CHANGE UP (ref 22–31)
CREAT SERPL-MCNC: 0.7 MG/DL — SIGNIFICANT CHANGE UP (ref 0.5–1.3)
CREAT SERPL-MCNC: 0.7 MG/DL — SIGNIFICANT CHANGE UP (ref 0.5–1.3)
EGFR: 121 ML/MIN/1.73M2 — SIGNIFICANT CHANGE UP
EGFR: 121 ML/MIN/1.73M2 — SIGNIFICANT CHANGE UP
EOSINOPHIL # BLD AUTO: 0.19 K/UL — SIGNIFICANT CHANGE UP (ref 0–0.5)
EOSINOPHIL # BLD AUTO: 0.19 K/UL — SIGNIFICANT CHANGE UP (ref 0–0.5)
EOSINOPHIL NFR BLD AUTO: 1.9 % — SIGNIFICANT CHANGE UP (ref 0–6)
EOSINOPHIL NFR BLD AUTO: 1.9 % — SIGNIFICANT CHANGE UP (ref 0–6)
GLUCOSE SERPL-MCNC: 110 MG/DL — HIGH (ref 70–99)
GLUCOSE SERPL-MCNC: 110 MG/DL — HIGH (ref 70–99)
HCT VFR BLD CALC: 42.5 % — SIGNIFICANT CHANGE UP (ref 34.5–45)
HCT VFR BLD CALC: 42.5 % — SIGNIFICANT CHANGE UP (ref 34.5–45)
HGB BLD-MCNC: 14.3 G/DL — SIGNIFICANT CHANGE UP (ref 11.5–15.5)
HGB BLD-MCNC: 14.3 G/DL — SIGNIFICANT CHANGE UP (ref 11.5–15.5)
IMM GRANULOCYTES NFR BLD AUTO: 0.4 % — SIGNIFICANT CHANGE UP (ref 0–0.9)
IMM GRANULOCYTES NFR BLD AUTO: 0.4 % — SIGNIFICANT CHANGE UP (ref 0–0.9)
INR BLD: 1.01 RATIO — SIGNIFICANT CHANGE UP (ref 0.85–1.18)
INR BLD: 1.01 RATIO — SIGNIFICANT CHANGE UP (ref 0.85–1.18)
LYMPHOCYTES # BLD AUTO: 3.08 K/UL — SIGNIFICANT CHANGE UP (ref 1–3.3)
LYMPHOCYTES # BLD AUTO: 3.08 K/UL — SIGNIFICANT CHANGE UP (ref 1–3.3)
LYMPHOCYTES # BLD AUTO: 30.8 % — SIGNIFICANT CHANGE UP (ref 13–44)
LYMPHOCYTES # BLD AUTO: 30.8 % — SIGNIFICANT CHANGE UP (ref 13–44)
MCHC RBC-ENTMCNC: 31.9 PG — SIGNIFICANT CHANGE UP (ref 27–34)
MCHC RBC-ENTMCNC: 31.9 PG — SIGNIFICANT CHANGE UP (ref 27–34)
MCHC RBC-ENTMCNC: 33.6 GM/DL — SIGNIFICANT CHANGE UP (ref 32–36)
MCHC RBC-ENTMCNC: 33.6 GM/DL — SIGNIFICANT CHANGE UP (ref 32–36)
MCV RBC AUTO: 94.9 FL — SIGNIFICANT CHANGE UP (ref 80–100)
MCV RBC AUTO: 94.9 FL — SIGNIFICANT CHANGE UP (ref 80–100)
MONOCYTES # BLD AUTO: 0.75 K/UL — SIGNIFICANT CHANGE UP (ref 0–0.9)
MONOCYTES # BLD AUTO: 0.75 K/UL — SIGNIFICANT CHANGE UP (ref 0–0.9)
MONOCYTES NFR BLD AUTO: 7.5 % — SIGNIFICANT CHANGE UP (ref 2–14)
MONOCYTES NFR BLD AUTO: 7.5 % — SIGNIFICANT CHANGE UP (ref 2–14)
NEUTROPHILS # BLD AUTO: 5.91 K/UL — SIGNIFICANT CHANGE UP (ref 1.8–7.4)
NEUTROPHILS # BLD AUTO: 5.91 K/UL — SIGNIFICANT CHANGE UP (ref 1.8–7.4)
NEUTROPHILS NFR BLD AUTO: 59 % — SIGNIFICANT CHANGE UP (ref 43–77)
NEUTROPHILS NFR BLD AUTO: 59 % — SIGNIFICANT CHANGE UP (ref 43–77)
NRBC # BLD: 0 /100 WBCS — SIGNIFICANT CHANGE UP (ref 0–0)
NRBC # BLD: 0 /100 WBCS — SIGNIFICANT CHANGE UP (ref 0–0)
PLATELET # BLD AUTO: 260 K/UL — SIGNIFICANT CHANGE UP (ref 150–400)
PLATELET # BLD AUTO: 260 K/UL — SIGNIFICANT CHANGE UP (ref 150–400)
POTASSIUM SERPL-MCNC: 3.6 MMOL/L — SIGNIFICANT CHANGE UP (ref 3.5–5.3)
POTASSIUM SERPL-MCNC: 3.6 MMOL/L — SIGNIFICANT CHANGE UP (ref 3.5–5.3)
POTASSIUM SERPL-SCNC: 3.6 MMOL/L — SIGNIFICANT CHANGE UP (ref 3.5–5.3)
POTASSIUM SERPL-SCNC: 3.6 MMOL/L — SIGNIFICANT CHANGE UP (ref 3.5–5.3)
PROT SERPL-MCNC: 7 G/DL — SIGNIFICANT CHANGE UP (ref 6–8.3)
PROT SERPL-MCNC: 7 G/DL — SIGNIFICANT CHANGE UP (ref 6–8.3)
PROTHROM AB SERPL-ACNC: 10.6 SEC — SIGNIFICANT CHANGE UP (ref 9.5–13)
PROTHROM AB SERPL-ACNC: 10.6 SEC — SIGNIFICANT CHANGE UP (ref 9.5–13)
RBC # BLD: 4.48 M/UL — SIGNIFICANT CHANGE UP (ref 3.8–5.2)
RBC # BLD: 4.48 M/UL — SIGNIFICANT CHANGE UP (ref 3.8–5.2)
RBC # FLD: 13 % — SIGNIFICANT CHANGE UP (ref 10.3–14.5)
RBC # FLD: 13 % — SIGNIFICANT CHANGE UP (ref 10.3–14.5)
SODIUM SERPL-SCNC: 136 MMOL/L — SIGNIFICANT CHANGE UP (ref 135–145)
SODIUM SERPL-SCNC: 136 MMOL/L — SIGNIFICANT CHANGE UP (ref 135–145)
TROPONIN T, HIGH SENSITIVITY RESULT: 6 NG/L — SIGNIFICANT CHANGE UP (ref 0–51)
TROPONIN T, HIGH SENSITIVITY RESULT: 6 NG/L — SIGNIFICANT CHANGE UP (ref 0–51)
WBC # BLD: 10.01 K/UL — SIGNIFICANT CHANGE UP (ref 3.8–10.5)
WBC # BLD: 10.01 K/UL — SIGNIFICANT CHANGE UP (ref 3.8–10.5)
WBC # FLD AUTO: 10.01 K/UL — SIGNIFICANT CHANGE UP (ref 3.8–10.5)
WBC # FLD AUTO: 10.01 K/UL — SIGNIFICANT CHANGE UP (ref 3.8–10.5)

## 2023-10-22 PROCEDURE — 82947 ASSAY GLUCOSE BLOOD QUANT: CPT

## 2023-10-22 PROCEDURE — 70496 CT ANGIOGRAPHY HEAD: CPT | Mod: 26,MA

## 2023-10-22 PROCEDURE — 82330 ASSAY OF CALCIUM: CPT

## 2023-10-22 PROCEDURE — 85025 COMPLETE CBC W/AUTO DIFF WBC: CPT

## 2023-10-22 PROCEDURE — 85014 HEMATOCRIT: CPT

## 2023-10-22 PROCEDURE — 93005 ELECTROCARDIOGRAM TRACING: CPT

## 2023-10-22 PROCEDURE — 83605 ASSAY OF LACTIC ACID: CPT

## 2023-10-22 PROCEDURE — 99291 CRITICAL CARE FIRST HOUR: CPT | Mod: 25

## 2023-10-22 PROCEDURE — 70450 CT HEAD/BRAIN W/O DYE: CPT | Mod: MA

## 2023-10-22 PROCEDURE — 70496 CT ANGIOGRAPHY HEAD: CPT | Mod: MA

## 2023-10-22 PROCEDURE — 82435 ASSAY OF BLOOD CHLORIDE: CPT

## 2023-10-22 PROCEDURE — 84484 ASSAY OF TROPONIN QUANT: CPT

## 2023-10-22 PROCEDURE — 82803 BLOOD GASES ANY COMBINATION: CPT

## 2023-10-22 PROCEDURE — 99291 CRITICAL CARE FIRST HOUR: CPT

## 2023-10-22 PROCEDURE — 84295 ASSAY OF SERUM SODIUM: CPT

## 2023-10-22 PROCEDURE — 80053 COMPREHEN METABOLIC PANEL: CPT

## 2023-10-22 PROCEDURE — 85610 PROTHROMBIN TIME: CPT

## 2023-10-22 PROCEDURE — 70498 CT ANGIOGRAPHY NECK: CPT | Mod: MA

## 2023-10-22 PROCEDURE — 85018 HEMOGLOBIN: CPT

## 2023-10-22 PROCEDURE — 70450 CT HEAD/BRAIN W/O DYE: CPT | Mod: 26,MA,59

## 2023-10-22 PROCEDURE — 82962 GLUCOSE BLOOD TEST: CPT

## 2023-10-22 PROCEDURE — 84132 ASSAY OF SERUM POTASSIUM: CPT

## 2023-10-22 PROCEDURE — 85730 THROMBOPLASTIN TIME PARTIAL: CPT

## 2023-10-22 PROCEDURE — 96375 TX/PRO/DX INJ NEW DRUG ADDON: CPT | Mod: XU

## 2023-10-22 PROCEDURE — 70498 CT ANGIOGRAPHY NECK: CPT | Mod: 26,MA

## 2023-10-22 PROCEDURE — 96374 THER/PROPH/DIAG INJ IV PUSH: CPT | Mod: XU

## 2023-10-22 RX ORDER — METOCLOPRAMIDE HCL 10 MG
10 TABLET ORAL ONCE
Refills: 0 | Status: COMPLETED | OUTPATIENT
Start: 2023-10-22 | End: 2023-10-22

## 2023-10-22 RX ORDER — SODIUM CHLORIDE 9 MG/ML
1000 INJECTION INTRAMUSCULAR; INTRAVENOUS; SUBCUTANEOUS ONCE
Refills: 0 | Status: COMPLETED | OUTPATIENT
Start: 2023-10-22 | End: 2023-10-22

## 2023-10-22 RX ORDER — KETOROLAC TROMETHAMINE 30 MG/ML
15 SYRINGE (ML) INJECTION ONCE
Refills: 0 | Status: DISCONTINUED | OUTPATIENT
Start: 2023-10-22 | End: 2023-10-22

## 2023-10-22 RX ADMIN — Medication 10 MILLIGRAM(S): at 18:07

## 2023-10-22 RX ADMIN — Medication 15 MILLIGRAM(S): at 18:54

## 2023-10-22 RX ADMIN — SODIUM CHLORIDE 1000 MILLILITER(S): 9 INJECTION INTRAMUSCULAR; INTRAVENOUS; SUBCUTANEOUS at 17:59

## 2023-10-22 NOTE — ED ADULT NURSE NOTE - NSFALLUNIVINTERV_ED_ALL_ED
Bed/Stretcher in lowest position, wheels locked, appropriate side rails in place/Call bell, personal items and telephone in reach/Instruct patient to call for assistance before getting out of bed/chair/stretcher/Non-slip footwear applied when patient is off stretcher/[place] to call system/Physically safe environment - no spills, clutter or unnecessary equipment/Purposeful proactive rounding/Room/bathroom lighting operational, light cord in reach

## 2023-10-22 NOTE — ED PROVIDER NOTE - PROGRESS NOTE DETAILS
Aubree Burks PA-C: labs and imaging reviewed. no acute pathology noted. neurology recommendations reviewed. patient reports symptoms have improved. requesting to go home. discussed importance of following up with neurology as outpatient. patient verbalized understanding and in agreement with plan. well appearing. stable for discharge. discussed with Dr. Denney.

## 2023-10-22 NOTE — CONSULT NOTE ADULT - ASSESSMENT
ASSESSMENT   28y (1995) F w/ PMHx significant for headaches presents to the ED after experiencing sudden onset LUE heaviness. 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 Stroke code called. LKW: 4:50 PM 10/22/2023. Patient was working when she noted "brain fog" with LUE heaviness occurring soon after. She took a Tylenol and an advil as she began to feel pain in her L Trapezius associated with the brain fog.  the patient recently suffered a fingerstick injury while in the emergency department for which she has been on postexposure prophylaxis for HIV with Truvada & Iscentris. She takes Junel birth control at home. CT head & CTA were done and non-acute. Patient was noted to have mild weakness in L wrist extension and flexion. She was within the windo and with no recent major surgeries, no use of blood thinners she was offered tenecteplase but refused as her deficits were not deemed to be worth the risk to her. Patient reported a history of complex migraines in her mother with a similar elvin of "brain fog". Since she started her  postexposure prophylaxis at the end in 3 days, she has been feeling the brain fog and she had before went to her  primary care doctor who believed this to be a possible side effect of her postexposure prophylaxis though was unsure.  She believes that she may have a migraine with aura.    Though the brain fog is a symptom she has been feeling since the Botox for prophylaxis, she reports that she had not felt the pain in her trapezius on the left side before.    NIHSS: 0  preMRS:0  Patient was not given tenecteplase as she refused  Patient was not a thrombectomy candidate as there was no LVO on imaging.     IMPRESSION   LUE weakness with "brain fog" & L trapezius pain in the setting of PEP for needle stick, known headaches. Concern for complex migraine. Lower concern for stroke    RECOMMENDATION   [] First line: recommend migraine medications (to be given all together at the same time): ketorolac (Toradol) 30mg IV q8h PRN (or acetaminophen 1g IV q8h PRN), metoclopramide (Reglan) 10mg q8h PRN, diphenhydramine (Benadryl) 25mg IV q8h PRN. Please repeat at least 2-3 cycles for medications to be effective.  [] IV hydration, Mg 2g IV x1  [] Second line: recommend solumedrol 125mg IV PRN x1  [] Third line: recommend depakote 500mg IV PRN x1    Migraine in pregnancy:  [] First line: recommend migraine medications (to be given all together at the same time): acetaminophen 1g IV q8h PRN, metoclopramide (Reglan) 10mg q8h PRN, diphenhydramine (Benadryl) 25mg IV q8h PRN. Please repeat at least 2-3 cycles for medications to be effective. Can consider ketorolac (Toradol) 30mg IV q8h PRN if not in second or third trimester  [] IV hydration, Mg 2g IV x1    Migraine education:  [] Encourage avoidance of common migraine triggers (artificial sweeteners, food preservative (MSG), aged cheese, skipping meals, change in wake/sleep cycle and intense physical exertion)  [] Encourage lifestyle changes that help migraine: physical exercise, fixed meal time, fixed sleep/wake cycle, avoid smoking and highly caffeinated products   [] Consider riboflavin 400mg daily, magnesium oxide 400mg daily, and CoQ10 for migraine prevention    Case seen and discussed with stroke fellow Dr. Koffi Pitt under the supervision of attending Dr. Mague Blakely. Note finalized upon attending attestation.  ASSESSMENT   28y (1995) F w/ PMHx significant for headaches presents to the ED after experiencing sudden onset LUE heaviness. 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 Stroke code called. LKW: 4:50 PM 10/22/2023. Patient was working when she noted "brain fog" with LUE heaviness occurring soon after. She took a Tylenol and an advil as she began to feel pain in her L Trapezius associated with the brain fog.  the patient recently suffered a fingerstick injury while in the emergency department for which she has been on postexposure prophylaxis for HIV with Truvada & Iscentris. She takes Junel birth control at home. CT head & CTA were done and non-acute. Patient was noted to have mild weakness in L wrist extension and flexion. She was within the windo and with no recent major surgeries, no use of blood thinners she was offered tenecteplase but refused as her deficits were not deemed to be worth the risk to her. Patient reported a history of complex migraines in her mother with a similar elvin of "brain fog". Since she started her  postexposure prophylaxis at the end in 3 days, she has been feeling the brain fog and she had before went to her  primary care doctor who believed this to be a possible side effect of her postexposure prophylaxis though was unsure.  She believes that she may have a migraine with aura.    Though the brain fog is a symptom she has been feeling since the Botox for prophylaxis, she reports that she had not felt the pain in her trapezius on the left side before.    NIHSS: 0  preMRS:0  Patient was not given tenecteplase as she refused  Patient was not a thrombectomy candidate as there was no LVO on imaging.     IMPRESSION   LUE weakness with "brain fog" & L trapezius pain in the setting of PEP for needle stick, known headaches. Concern for complex migraine. Lower concern for stroke    RECOMMENDATION   [] First line: recommend migraine medications (to be given all together at the same time): ketorolac (Toradol) 30mg IV q8h PRN (or acetaminophen 1g IV q8h PRN), metoclopramide (Reglan) 10mg q8h PRN, diphenhydramine (Benadryl) 25mg IV q8h PRN. Please repeat at least 2-3 cycles for medications to be effective.  [] IV hydration, Mg 2g IV x1  [] Second line: recommend solumedrol 125mg IV PRN x1  [] Third line: recommend depakote 500mg IV PRN x1    Migraine in pregnancy:  [] First line: recommend migraine medications (to be given all together at the same time): acetaminophen 1g IV q8h PRN, metoclopramide (Reglan) 10mg q8h PRN, diphenhydramine (Benadryl) 25mg IV q8h PRN. Please repeat at least 2-3 cycles for medications to be effective. Can consider ketorolac (Toradol) 30mg IV q8h PRN if not in second or third trimester  [] IV hydration, Mg 2g IV x1    Migraine education:  [] Encourage avoidance of common migraine triggers (artificial sweeteners, food preservative (MSG), aged cheese, skipping meals, change in wake/sleep cycle and intense physical exertion)  [] Encourage lifestyle changes that help migraine: physical exercise, fixed meal time, fixed sleep/wake cycle, avoid smoking and highly caffeinated products   [] Consider riboflavin 400mg daily, magnesium oxide 400mg daily, and CoQ10 for migraine prevention    Case seen and discussed with stroke fellow Dr. Koffi Pitt under the supervision of attending Dr. Mague Blakely. Note finalized upon attending attestation.  ASSESSMENT   28y (1995) F w/ PMHx significant for headaches presents to the ED after experiencing sudden onset LUE heaviness. 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 Stroke code called. LKW: 4:50 PM 10/22/2023. Patient was working when she noted "brain fog" with LUE heaviness occurring soon after. She took a Tylenol and an advil as she began to feel pain in her L Trapezius associated with the brain fog.  the patient recently suffered a fingerstick injury while in the emergency department for which she has been on postexposure prophylaxis for HIV with Truvada & Iscentris. She takes Junel birth control at home. CT head & CTA were done and non-acute. Patient was noted to have mild weakness in L wrist extension and flexion. She was within the windo and with no recent major surgeries, no use of blood thinners she was offered tenecteplase but refused as her deficits were not deemed to be worth the risk to her. Patient reported a history of complex migraines in her mother with a similar elvin of "brain fog". Since she started her  postexposure prophylaxis at the end in 3 days, she has been feeling the brain fog and she had before went to her  primary care doctor who believed this to be a possible side effect of her postexposure prophylaxis though was unsure.  She believes that she may have a migraine with aura.    Though the brain fog is a symptom she has been feeling since the Botox for prophylaxis, she reports that she had not felt the pain in her trapezius on the left side before.    NIHSS: 0  preMRS:0  Patient was not given tenecteplase as she refused  Patient was not a thrombectomy candidate as there was no LVO on imaging.     IMPRESSION   LUE weakness with "brain fog" & L trapezius pain in the setting of PEP for needle stick, known headaches. Concern for complex migraine. Lower concern for stroke    RECOMMENDATION   [] First line: recommend migraine medications (to be given all together at the same time): ketorolac (Toradol) 30mg IV q8h PRN (or acetaminophen 1g IV q8h PRN), metoclopramide (Reglan) 10mg q8h PRN, diphenhydramine (Benadryl) 25mg IV q8h PRN. Please repeat at least 2-3 cycles for medications to be effective.  [] IV hydration, Mg 2g IV x1  [] Second line: recommend solumedrol 125mg IV PRN x1  [] Third line: recommend depakote 500mg IV PRN x1    Migraine in pregnancy:  [] First line: recommend migraine medications (to be given all together at the same time): acetaminophen 1g IV q8h PRN, metoclopramide (Reglan) 10mg q8h PRN, diphenhydramine (Benadryl) 25mg IV q8h PRN. Please repeat at least 2-3 cycles for medications to be effective. Can consider ketorolac (Toradol) 30mg IV q8h PRN if not in second or third trimester  [] IV hydration, Mg 2g IV x1    Migraine education:  [] Encourage avoidance of common migraine triggers (artificial sweeteners, food preservative (MSG), aged cheese, skipping meals, change in wake/sleep cycle and intense physical exertion)  [] Encourage lifestyle changes that help migraine: physical exercise, fixed meal time, fixed sleep/wake cycle, avoid smoking and highly caffeinated products   [] Consider riboflavin 400mg daily, magnesium oxide 400mg daily, and CoQ10 for migraine prevention    Case seen and discussed with stroke fellow Dr. Koffi Pitt under the supervision of attending Dr. Mague Blakely. Note finalized upon attending attestation.

## 2023-10-22 NOTE — CONSULT NOTE ADULT - SUBJECTIVE AND OBJECTIVE BOX
Neurology - Consult Note    -  Spectra: [phone] ([facility]), [phone] ([facility])  -    HPI: ELI DIANE, 28y (1995) F w/ PMHx significant for headaches presents to the ED after experiencing sudden onset LUE heaviness. 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 Stroke code called. LKW: 4:50 PM 10/22/2023. Patient was working when she noted "brain fog" with LUE heaviness occurring soon after. She took a Tylenol and an advil as she began to feel pain in her L Trapezius associated with the brain fog.  the patient recently suffered a fingerstick injury while in the emergency department for which she has been on postexposure prophylaxis for HIV with Truvada & Iscentris. She takes Junel birth control at home. CT head & CTA were done and non-acute. Patient was noted to have mild weakness in L wrist extension and flexion. She was within the windo and with no recent major surgeries, no use of blood thinners she was offered tenecteplase but refused as her deficits were not deemed to be worth the risk to her. Patient reported a history of complex migraines in her mother with a similar elvin of "brain fog". Since she started her  postexposure prophylaxis at the end in 3 days, she has been feeling the brain fog and she had before went to her  primary care doctor who believed this to be a possible side effect of her postexposure prophylaxis though was unsure.  She believes that she may have a migraine with aura.    Though the brain fog is a symptom she has been feeling since pose exposure prophylaxis though she reports that she had not felt the pain in her trapezius on the left side before. Patient currently reports extension of her trapezius pain into the occipital region. Patient denies nausea, vomiting, dizziness, hearing changes, vision changes    NIHSS: 0  preMRS:0  Patient was not given tenecteplase as she refused  Patient was not a thrombectomy candidate as there was no LVO on imaging.       Review of Systems:   All other review of systems is negative unless indicated above.    Allergies:  sulfa drugs (Rash)      PMHx/PSHx/Family Hx: As above, otherwise see below   No pertinent past medical history        Social Hx:  No current use of tobacco, alcohol, or illicit drugs      Medications:  MEDICATIONS  (STANDING):    MEDICATIONS  (PRN):      Vitals:  T(C): 36.7 (10-22-23 @ 19:00), Max: 37.1 (10-22-23 @ 17:25)  HR: 73 (10-22-23 @ 19:00) (73 - 91)  BP: 123/78 (10-22-23 @ 19:00) (123/78 - 154/90)  RR: 17 (10-22-23 @ 19:00) (16 - 20)  SpO2: 100% (10-22-23 @ 19:00) (98% - 100%)    Physical Examination:  General - NAD  Cardiovascular - Peripheral pulses palpable, no edema  Eyes -  Fundoscopy not performed due to safety precautions in the setting of infection risk    Neurologic Exam:  Mental status - Awake, Alert, Oriented to person, place, and time. Speech fluent, repetition and naming intact. Follows simple and complex commands. Attention/concentration, recent and remote memory (including registration and recall), and fund of knowledge intact    Cranial nerves - PERRLA, VFF, EOMI, face sensation (V1-V3) intact b/l, facial strength intact without asymmetry b/l, hearing intact b/l, palate with symmetric elevation, trapezius  5/5 strength b/l, tongue midline on protrusion with full lateral movement    Motor - Normal bulk and tone throughout. No pronator drift.  Strength testing            R        Deltoid:  5    Biceps:  5    Triceps:  5    Wrist Extension:  4+  Wrist Flexion:  4+  Interossei:  4+  :  5    Hip Flexion:  5    Hip Extension:  5    Knee Flexion:  5    Knee Extension:  5    Dorsiflexion:  5    Plantar Flexion:  5        L        Deltoid:  5    Biceps:  5    Triceps:  5    Wrist Extension:  5    Wrist Flexion:  5    Interossei:  5    :  5    Hip Flexion:  5    Hip Extension:  5    Knee Flexion:  5    Knee Extension:  5    Dorsiflexion:  5    Plantar Flexion:  5      Sensation - Light touch/temperature intact throughout    DTR's -               R  Biceps:  2+    Triceps:  2+    Brachioradialis:  2+    Patellar:  2+    Ankle:  2+    Toes/plantar response:  Down    L  Biceps:  2+    Triceps:  2+    Brachioradialis:  2+    Patellar:  2+    Ankle:  2+    Toes/plantar response:  Down    Coordination - Finger to Nose intact b/l. No tremors appreciated    Gait and station - Normal casual gait. Romberg (-)    Labs:                        14.3   10.01 )-----------( 260      ( 22 Oct 2023 17:32 )             42.5     10-22    136  |  102  |  12  ----------------------------<  110<H>  3.6   |  23  |  0.70    Ca    9.2      22 Oct 2023 17:32    TPro  7.0  /  Alb  4.6  /  TBili  0.4  /  DBili  x   /  AST  20  /  ALT  18  /  AlkPhos  39<L>  10-22    CAPILLARY BLOOD GLUCOSE      POCT Blood Glucose.: 100 mg/dL (22 Oct 2023 17:30)    LIVER FUNCTIONS - ( 22 Oct 2023 17:32 )  Alb: 4.6 g/dL / Pro: 7.0 g/dL / ALK PHOS: 39 U/L / ALT: 18 U/L / AST: 20 U/L / GGT: x             PT/INR - ( 22 Oct 2023 17:32 )   PT: 10.6 sec;   INR: 1.01 ratio         PTT - ( 22 Oct 2023 17:32 )  PTT:27.9 sec  CSF:                  Radiology:    < from: CT Brain Stroke Protocol (10.22.23 @ 17:41) >  IMPRESSION:    No CT evidence of acute intracranial hemorrhage, brain edema, or mass   effect.    < end of copied text >  < from: CT Angio Neck Stroke Protocol w/ IV Cont (10.22.23 @ 17:43) >  IMPRESSION:    CTA COW:  Patent intracranial circulation without flow limiting stenosis.    CTA NECK: Patent, ECAs, ICAs, no  hemodynamically significant stenosis at    ICA origins by NASCET criteria.  Bilateral vertebral arteries are patent without flow limiting stenosis    < end of copied text >   Neurology - Consult Note    -  Spectra: [phone] ([facility]), [phone] ([facility])  -    HPI: ELI DIANE, 28y (1995) F w/ PMHx significant for headaches presents to the ED after experiencing sudden onset LUE heaviness. 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 LKW: 4:50 PM 10/22/2023. Patient was working when she noted "brain fog" with LUE heaviness occurring soon after. She took a Tylenol and an advil as she began to feel pain in her L Trapezius associated with the brain fog.  the patient recently suffered a fingerstick injury while in the emergency department for which she has been on postexposure prophylaxis for HIV with Truvada & Iscentris. She takes Junel birth control at home. CT head & CTA were done and non-acute. Patient was noted to have mild weakness in L wrist extension and flexion. She was within the windo and with no recent major surgeries, no use of blood thinners she was offered tenecteplase but refused as her deficits were not deemed to be worth the risk to her. Patient reported a history of complex migraines in her mother with a similar elvin of "brain fog". Since she started her  postexposure prophylaxis at the end in 3 days, she has been feeling the brain fog and she had before went to her  primary care doctor who believed this to be a possible side effect of her postexposure prophylaxis though was unsure.  She believes that she may have a migraine with aura.    Though the brain fog is a symptom she has been feeling since pose exposure prophylaxis though she reports that she had not felt the pain in her trapezius on the left side before. Patient currently reports extension of her trapezius pain into the occipital region. Patient denies nausea, vomiting, dizziness, hearing changes, vision changes    NIHSS: 0  preMRS:0  Patient was not given tenecteplase as she refused  Patient was not a thrombectomy candidate as there was no LVO on imaging.       Review of Systems:   All other review of systems is negative unless indicated above.    Allergies:  sulfa drugs (Rash)      PMHx/PSHx/Family Hx: As above, otherwise see below   No pertinent past medical history        Social Hx:  No current use of tobacco, alcohol, or illicit drugs      Medications:  MEDICATIONS  (STANDING):    MEDICATIONS  (PRN):      Vitals:  T(C): 36.7 (10-22-23 @ 19:00), Max: 37.1 (10-22-23 @ 17:25)  HR: 73 (10-22-23 @ 19:00) (73 - 91)  BP: 123/78 (10-22-23 @ 19:00) (123/78 - 154/90)  RR: 17 (10-22-23 @ 19:00) (16 - 20)  SpO2: 100% (10-22-23 @ 19:00) (98% - 100%)    Physical Examination:  General - NAD  Cardiovascular - Peripheral pulses palpable, no edema  Eyes -  Fundoscopy not performed due to safety precautions in the setting of infection risk    Neurologic Exam:  Mental status - Awake, Alert, Oriented to person, place, and time. Speech fluent, repetition and naming intact. Follows simple and complex commands. Attention/concentration, recent and remote memory (including registration and recall), and fund of knowledge intact    Cranial nerves - PERRLA, VFF, EOMI, face sensation (V1-V3) intact b/l, facial strength intact without asymmetry b/l, hearing intact b/l, palate with symmetric elevation, trapezius  5/5 strength b/l, tongue midline on protrusion with full lateral movement    Motor - Normal bulk and tone throughout. No pronator drift.  Strength testing            R        Deltoid:  5    Biceps:  5    Triceps:  5    Wrist Extension:  4+  Wrist Flexion:  4+  Interossei:  4+  :  5    Hip Flexion:  5    Hip Extension:  5    Knee Flexion:  5    Knee Extension:  5    Dorsiflexion:  5    Plantar Flexion:  5        L        Deltoid:  5    Biceps:  5    Triceps:  5    Wrist Extension:  5    Wrist Flexion:  5    Interossei:  5    :  5    Hip Flexion:  5    Hip Extension:  5    Knee Flexion:  5    Knee Extension:  5    Dorsiflexion:  5    Plantar Flexion:  5      Sensation - Light touch/temperature intact throughout    DTR's -               R  Biceps:  2+    Triceps:  2+    Brachioradialis:  2+    Patellar:  2+    Ankle:  2+    Toes/plantar response:  Down    L  Biceps:  2+    Triceps:  2+    Brachioradialis:  2+    Patellar:  2+    Ankle:  2+    Toes/plantar response:  Down    Coordination - Finger to Nose intact b/l. No tremors appreciated    Gait and station - Normal casual gait. Romberg (-)    Labs:                        14.3   10.01 )-----------( 260      ( 22 Oct 2023 17:32 )             42.5     10-22    136  |  102  |  12  ----------------------------<  110<H>  3.6   |  23  |  0.70    Ca    9.2      22 Oct 2023 17:32    TPro  7.0  /  Alb  4.6  /  TBili  0.4  /  DBili  x   /  AST  20  /  ALT  18  /  AlkPhos  39<L>  10-22    CAPILLARY BLOOD GLUCOSE      POCT Blood Glucose.: 100 mg/dL (22 Oct 2023 17:30)    LIVER FUNCTIONS - ( 22 Oct 2023 17:32 )  Alb: 4.6 g/dL / Pro: 7.0 g/dL / ALK PHOS: 39 U/L / ALT: 18 U/L / AST: 20 U/L / GGT: x             PT/INR - ( 22 Oct 2023 17:32 )   PT: 10.6 sec;   INR: 1.01 ratio         PTT - ( 22 Oct 2023 17:32 )  PTT:27.9 sec  CSF:                  Radiology:    < from: CT Brain Stroke Protocol (10.22.23 @ 17:41) >  IMPRESSION:    No CT evidence of acute intracranial hemorrhage, brain edema, or mass   effect.    < end of copied text >  < from: CT Angio Neck Stroke Protocol w/ IV Cont (10.22.23 @ 17:43) >  IMPRESSION:    CTA COW:  Patent intracranial circulation without flow limiting stenosis.    CTA NECK: Patent, ECAs, ICAs, no  hemodynamically significant stenosis at    ICA origins by NASCET criteria.  Bilateral vertebral arteries are patent without flow limiting stenosis    < end of copied text >   Neurology - Consult Note    -  Spectra: [phone] ([facility]), [phone] ([facility])  -    HPI: ELI DIANE, 28y (1995) F w/ PMHx significant for headaches presents to the ED after experiencing sudden onset LUE heaviness. 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 LKW: 4:50 PM 10/22/2023. Patient was working when she noted "brain fog" with LUE heaviness occurring soon after. She took a Tylenol and an advil as she began to feel pain in her L Trapezius associated with the brain fog.  the patient recently suffered a fingerstick injury while in the emergency department for which she has been on postexposure prophylaxis for HIV with Truvada & Iscentris. She takes Junel birth control at home. CT head & CTA were done and non-acute. Patient was noted to have mild weakness in L wrist extension and flexion. She was within the windo and with no recent major surgeries, no use of blood thinners she was offered tenecteplase but refused as her deficits were not deemed to be worth the risk to her. Patient reported a history of complex migraines in her mother with a similar elvin of "brain fog". Since she started her  postexposure prophylaxis at the end in 3 days, she has been feeling the brain fog and she had before went to her  primary care doctor who believed this to be a possible side effect of her postexposure prophylaxis though was unsure.  She believes that she may have a migraine with aura.    Though the brain fog is a symptom she has been feeling since pose exposure prophylaxis though she reports that she had not felt the pain in her trapezius on the left side before. Patient currently reports extension of her trapezius pain into the occipital region. Patient denies nausea, vomiting, dizziness, hearing changes, vision changes    NIHSS: 0  preMRS:0  Patient was not given tenecteplase as she refused  Patient was not a thrombectomy candidate as there was no LVO on imaging.       Review of Systems:   All other review of systems is negative unless indicated above.    Allergies:  sulfa drugs (Rash)      PMHx/PSHx/Family Hx: As above, otherwise see below   No pertinent past medical history        Social Hx:  No current use of tobacco, alcohol, or illicit drugs      Medications:  MEDICATIONS  (STANDING):    MEDICATIONS  (PRN):      Vitals:  T(C): 36.7 (10-22-23 @ 19:00), Max: 37.1 (10-22-23 @ 17:25)  HR: 73 (10-22-23 @ 19:00) (73 - 91)  BP: 123/78 (10-22-23 @ 19:00) (123/78 - 154/90)  RR: 17 (10-22-23 @ 19:00) (16 - 20)  SpO2: 100% (10-22-23 @ 19:00) (98% - 100%)    Physical Examination:  General - NAD  Cardiovascular - Peripheral pulses palpable, no edema  Eyes -  Fundoscopy not performed due to safety precautions in the setting of infection risk    Neurologic Exam:  Mental status - Awake, Alert, Oriented to person, place, and time. Speech fluent, repetition and naming intact. Follows simple and complex commands. Attention/concentration, recent and remote memory (including registration and recall), and fund of knowledge intact    Cranial nerves - PERRLA, VFF, EOMI, face sensation (V1-V3) intact b/l, facial strength intact without asymmetry b/l, hearing intact b/l, palate with symmetric elevation, trapezius  5/5 strength b/l, tongue midline on protrusion with full lateral movement    Motor - Normal bulk and tone throughout. No pronator drift.  Strength testing            R        Deltoid:  5    Biceps:  5    Triceps:  5    Wrist Extension:  4+  Wrist Flexion:  4+  Interossei:  4+  :  5    Hip Flexion:  5    Hip Extension:  5    Knee Flexion:  5    Knee Extension:  5    Dorsiflexion:  5    Plantar Flexion:  5        L        Deltoid:  5    Biceps:  5    Triceps:  5    Wrist Extension:  5    Wrist Flexion:  5    Interossei:  5    :  5    Hip Flexion:  5    Hip Extension:  5    Knee Flexion:  5    Knee Extension:  5    Dorsiflexion:  5    Plantar Flexion:  5      Sensation - Light touch/temperature intact throughout    DTR's -               R  Biceps:  2+    Triceps:  2+    Brachioradialis:  2+    Patellar:  2+    Ankle:  2+    Toes/plantar response:  Down    L  Biceps:  2+    Triceps:  2+    Brachioradialis:  2+    Patellar:  2+    Ankle:  2+    Toes/plantar response:  Down    Coordination - Finger to Nose intact b/l. No tremors appreciated    Gait and station - Normal casual gait. Romberg (-)    Labs:                        14.3   10.01 )-----------( 260      ( 22 Oct 2023 17:32 )             42.5     10-22    136  |  102  |  12  ----------------------------<  110<H>  3.6   |  23  |  0.70    Ca    9.2      22 Oct 2023 17:32    TPro  7.0  /  Alb  4.6  /  TBili  0.4  /  DBili  x   /  AST  20  /  ALT  18  /  AlkPhos  39<L>  10-22    CAPILLARY BLOOD GLUCOSE      POCT Blood Glucose.: 100 mg/dL (22 Oct 2023 17:30)    LIVER FUNCTIONS - ( 22 Oct 2023 17:32 )  Alb: 4.6 g/dL / Pro: 7.0 g/dL / ALK PHOS: 39 U/L / ALT: 18 U/L / AST: 20 U/L / GGT: x             PT/INR - ( 22 Oct 2023 17:32 )   PT: 10.6 sec;   INR: 1.01 ratio         PTT - ( 22 Oct 2023 17:32 )  PTT:27.9 sec  CSF:                  Radiology:    < from: CT Brain Stroke Protocol (10.22.23 @ 17:41) >  IMPRESSION:    No CT evidence of acute intracranial hemorrhage, brain edema, or mass   effect.    < end of copied text >  < from: CT Angio Neck Stroke Protocol w/ IV Cont (10.22.23 @ 17:43) >  IMPRESSION:    CTA COW:  Patent intracranial circulation without flow limiting stenosis.    CTA NECK: Patent, ECAs, ICAs, no  hemodynamically significant stenosis at    ICA origins by NASCET criteria.  Bilateral vertebral arteries are patent without flow limiting stenosis    < end of copied text >

## 2023-10-22 NOTE — ED PROVIDER NOTE - ATTENDING CONTRIBUTION TO CARE
Attending MD Denney:  I performed a history and physical exam of the patient and discussed their management with the PA. I reviewed the PA's note and agree with the documented findings and plan of care. My medical decision making and observations are found above.

## 2023-10-22 NOTE — ED ADULT TRIAGE NOTE - BP NONINVASIVE SYSTOLIC (MM HG)
Chart reviewed. Patient encountered seated in recliner chair in [facility] waiting area with NAD. Patient underwent occupational therapy pre-operative consultation to determine current functional ADL limitations in order to provide the right equipment for patient to perform functional ADLS post operation.
154

## 2023-10-22 NOTE — ED PROVIDER NOTE - PATIENT PORTAL LINK FT
You can access the FollowMyHealth Patient Portal offered by [facility] by registering at the following website: [email or web address]. By joining [facility]’s FollowMyHealth portal, you will also be able to view your health information using other applications (apps) compatible with our system.

## 2023-10-22 NOTE — ED PROVIDER NOTE - OBJECTIVE STATEMENT
29 y/o female, denies pmh, on PEP as prophylaxis s/p needle stick, on OCPs, resident in the ER, presents as code stroke. patient states she was at her desk typing and her left hand started to feel numb. states started about an hour ago. states also has associated lightheadedness. states since taking PEP has been having migraines but never experienced symptoms like this. states does not feel like herself. denies fall or hitting her head. denies LOC. denies f/n/v/d, CP, SOB, HA, dizziness, abdominal pain, urinary symptoms, cough.

## 2023-10-22 NOTE — ED PROVIDER NOTE - NSFOLLOWUPCLINICS_GEN_ALL_ED_FT
[facility]  Neurology  [address] [place] [address] - [address] [place] [address]  Phone: [phone]  Fax:   Follow Up Time: Urgent

## 2023-10-22 NOTE — ED PROVIDER NOTE - NSFOLLOWUPINSTRUCTIONS_ED_ALL_ED_FT
1. It is important to follow up with your primary care doctor in 1-2 days  it is important to follow up with neurology in 1-2 days     2. bring a copy of all your results to your follow up appointments    3. you can take Tylenol as needed for pain. you can take 650mg of Tylenol every 6 hours as needed for pain. do not take more than 4000mg in a 24 hour period.     4. if your symptoms worsen, persist, or if any new symptoms develop, or if you experience any signs of distress, return to the ER right away.

## 2023-10-22 NOTE — ED PROVIDER NOTE - CLINICAL SUMMARY MEDICAL DECISION MAKING FREE TEXT BOX
Attending MD Denney.  Agree with above.  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  Pt was typing and had onset of LUE clumsiness/weakness at that time assoc with L posterolateral neck pain.  Pt endorses vague light-headedness/dizziness concomitant with these sxs.  Of note, pt takes OCP's and is on day 27/28 of HAART s/p needle stick at work for PPX.  No hx of HIV/AIDS.  Pt has had mild headaches, generalized malaise since commencing HAART but has not had LUE sensory changes.  Stroke code called on arrival and pt expedited to CT with neuro present for delineation between more likely complex migraine and acute stroke.

## 2023-10-22 NOTE — ED PROVIDER NOTE - PHYSICAL EXAMINATION
neuro: AOx4. full sensation intact. strength 4/5 LUE and 5/5 RUE. strength 5/5 b/l lower extremities. full sensation intact. finger to nose intact.

## 2023-10-22 NOTE — ED ADULT NURSE NOTE - OBJECTIVE STATEMENT
PT is a 28 yr old female with pmh of migraines starting recently after taking PEP (on second to last day) and on birth control coming from work for headache and arm weakness. Pt was at work when she felt "fuzzy" in her head and had a difficult time typing in the left hand around 5pm. PT states she had recently ate and drank water. Of note, pt also endorses neck pain down the left side of her neck. Code stroke activated. Upon exam, pt is neurologically intact- but does have slightly decreased strength in the left hand- but no drift or facial droop. PT is a/ox 3- vitals stable.

## 2023-10-28 ENCOUNTER — EMERGENCY (EMERGENCY)
Facility: HOSPITAL | Age: 28
LOS: 1 days | Discharge: HOME | End: 2023-10-28
Attending: STUDENT IN AN ORGANIZED HEALTH CARE EDUCATION/TRAINING PROGRAM | Admitting: STUDENT IN AN ORGANIZED HEALTH CARE EDUCATION/TRAINING PROGRAM
Payer: COMMERCIAL

## 2023-10-28 VITALS
DIASTOLIC BLOOD PRESSURE: 76 MMHG | SYSTOLIC BLOOD PRESSURE: 118 MMHG | TEMPERATURE: 99 F | HEART RATE: 77 BPM | OXYGEN SATURATION: 100 % | RESPIRATION RATE: 18 BRPM

## 2023-10-28 VITALS
SYSTOLIC BLOOD PRESSURE: 112 MMHG | OXYGEN SATURATION: 98 % | DIASTOLIC BLOOD PRESSURE: 78 MMHG | WEIGHT: 134.92 LBS | HEART RATE: 73 BPM | HEIGHT: 67 IN | TEMPERATURE: 99 F | RESPIRATION RATE: 18 BRPM

## 2023-10-28 LAB
ALBUMIN SERPL ELPH-MCNC: 4.2 G/DL — SIGNIFICANT CHANGE UP (ref 3.3–5)
ALP SERPL-CCNC: 38 U/L — LOW (ref 40–120)
ALT FLD-CCNC: 15 U/L — SIGNIFICANT CHANGE UP (ref 10–45)
ANION GAP SERPL CALC-SCNC: 10 MMOL/L — SIGNIFICANT CHANGE UP (ref 5–17)
AST SERPL-CCNC: 17 U/L — SIGNIFICANT CHANGE UP (ref 10–40)
BASOPHILS # BLD AUTO: 0.05 K/UL — SIGNIFICANT CHANGE UP (ref 0–0.2)
BASOPHILS NFR BLD AUTO: 0.7 % — SIGNIFICANT CHANGE UP (ref 0–2)
BILIRUB SERPL-MCNC: 0.3 MG/DL — SIGNIFICANT CHANGE UP (ref 0.2–1.2)
BUN SERPL-MCNC: 9 MG/DL — SIGNIFICANT CHANGE UP (ref 7–23)
CALCIUM SERPL-MCNC: 9.4 MG/DL — SIGNIFICANT CHANGE UP (ref 8.4–10.5)
CHLORIDE SERPL-SCNC: 104 MMOL/L — SIGNIFICANT CHANGE UP (ref 96–108)
CO2 SERPL-SCNC: 27 MMOL/L — SIGNIFICANT CHANGE UP (ref 22–31)
CREAT SERPL-MCNC: 0.75 MG/DL — SIGNIFICANT CHANGE UP (ref 0.5–1.3)
EGFR: 111 ML/MIN/1.73M2 — SIGNIFICANT CHANGE UP
EOSINOPHIL # BLD AUTO: 0.17 K/UL — SIGNIFICANT CHANGE UP (ref 0–0.5)
EOSINOPHIL NFR BLD AUTO: 2.3 % — SIGNIFICANT CHANGE UP (ref 0–6)
GLUCOSE SERPL-MCNC: 117 MG/DL — HIGH (ref 70–99)
HCT VFR BLD CALC: 41.4 % — SIGNIFICANT CHANGE UP (ref 34.5–45)
HGB BLD-MCNC: 14.3 G/DL — SIGNIFICANT CHANGE UP (ref 11.5–15.5)
IMM GRANULOCYTES NFR BLD AUTO: 0.4 % — SIGNIFICANT CHANGE UP (ref 0–0.9)
LYMPHOCYTES # BLD AUTO: 1.9 K/UL — SIGNIFICANT CHANGE UP (ref 1–3.3)
LYMPHOCYTES # BLD AUTO: 25.3 % — SIGNIFICANT CHANGE UP (ref 13–44)
MAGNESIUM SERPL-MCNC: 2.1 MG/DL — SIGNIFICANT CHANGE UP (ref 1.6–2.6)
MCHC RBC-ENTMCNC: 32.1 PG — SIGNIFICANT CHANGE UP (ref 27–34)
MCHC RBC-ENTMCNC: 34.5 GM/DL — SIGNIFICANT CHANGE UP (ref 32–36)
MCV RBC AUTO: 93 FL — SIGNIFICANT CHANGE UP (ref 80–100)
MONOCYTES # BLD AUTO: 0.49 K/UL — SIGNIFICANT CHANGE UP (ref 0–0.9)
MONOCYTES NFR BLD AUTO: 6.5 % — SIGNIFICANT CHANGE UP (ref 2–14)
NEUTROPHILS # BLD AUTO: 4.87 K/UL — SIGNIFICANT CHANGE UP (ref 1.8–7.4)
NEUTROPHILS NFR BLD AUTO: 64.8 % — SIGNIFICANT CHANGE UP (ref 43–77)
NRBC # BLD: 0 /100 WBCS — SIGNIFICANT CHANGE UP (ref 0–0)
PLATELET # BLD AUTO: 246 K/UL — SIGNIFICANT CHANGE UP (ref 150–400)
POTASSIUM SERPL-MCNC: 4.2 MMOL/L — SIGNIFICANT CHANGE UP (ref 3.5–5.3)
POTASSIUM SERPL-SCNC: 4.2 MMOL/L — SIGNIFICANT CHANGE UP (ref 3.5–5.3)
PROT SERPL-MCNC: 6.7 G/DL — SIGNIFICANT CHANGE UP (ref 6–8.3)
RAPID RVP RESULT: SIGNIFICANT CHANGE UP
RBC # BLD: 4.45 M/UL — SIGNIFICANT CHANGE UP (ref 3.8–5.2)
RBC # FLD: 12.9 % — SIGNIFICANT CHANGE UP (ref 10.3–14.5)
SARS-COV-2 RNA SPEC QL NAA+PROBE: SIGNIFICANT CHANGE UP
SODIUM SERPL-SCNC: 141 MMOL/L — SIGNIFICANT CHANGE UP (ref 135–145)
TSH SERPL-MCNC: 2.09 UIU/ML — SIGNIFICANT CHANGE UP (ref 0.27–4.2)
WBC # BLD: 7.51 K/UL — SIGNIFICANT CHANGE UP (ref 3.8–10.5)
WBC # FLD AUTO: 7.51 K/UL — SIGNIFICANT CHANGE UP (ref 3.8–10.5)

## 2023-10-28 PROCEDURE — 36415 COLL VENOUS BLD VENIPUNCTURE: CPT

## 2023-10-28 PROCEDURE — 72156 MRI NECK SPINE W/O & W/DYE: CPT | Mod: MA

## 2023-10-28 PROCEDURE — 72156 MRI NECK SPINE W/O & W/DYE: CPT | Mod: 26,MA

## 2023-10-28 PROCEDURE — 99285 EMERGENCY DEPT VISIT HI MDM: CPT | Mod: 25

## 2023-10-28 PROCEDURE — 83735 ASSAY OF MAGNESIUM: CPT

## 2023-10-28 PROCEDURE — 99285 EMERGENCY DEPT VISIT HI MDM: CPT

## 2023-10-28 PROCEDURE — 82962 GLUCOSE BLOOD TEST: CPT

## 2023-10-28 PROCEDURE — 93010 ELECTROCARDIOGRAM REPORT: CPT

## 2023-10-28 PROCEDURE — 84443 ASSAY THYROID STIM HORMONE: CPT

## 2023-10-28 PROCEDURE — 70553 MRI BRAIN STEM W/O & W/DYE: CPT | Mod: MA

## 2023-10-28 PROCEDURE — 0225U NFCT DS DNA&RNA 21 SARSCOV2: CPT

## 2023-10-28 PROCEDURE — 70553 MRI BRAIN STEM W/O & W/DYE: CPT | Mod: 26,MA

## 2023-10-28 PROCEDURE — 96375 TX/PRO/DX INJ NEW DRUG ADDON: CPT

## 2023-10-28 PROCEDURE — 96374 THER/PROPH/DIAG INJ IV PUSH: CPT | Mod: XU

## 2023-10-28 PROCEDURE — A9585: CPT

## 2023-10-28 PROCEDURE — 85025 COMPLETE CBC W/AUTO DIFF WBC: CPT

## 2023-10-28 PROCEDURE — 93005 ELECTROCARDIOGRAM TRACING: CPT

## 2023-10-28 PROCEDURE — 80053 COMPREHEN METABOLIC PANEL: CPT

## 2023-10-28 RX ORDER — MAGNESIUM SULFATE 500 MG/ML
1 VIAL (ML) INJECTION ONCE
Refills: 0 | Status: COMPLETED | OUTPATIENT
Start: 2023-10-28 | End: 2023-10-28

## 2023-10-28 RX ORDER — FAMOTIDINE 10 MG/ML
20 INJECTION INTRAVENOUS ONCE
Refills: 0 | Status: COMPLETED | OUTPATIENT
Start: 2023-10-28 | End: 2023-10-28

## 2023-10-28 RX ORDER — KETOROLAC TROMETHAMINE 30 MG/ML
15 SYRINGE (ML) INJECTION ONCE
Refills: 0 | Status: DISCONTINUED | OUTPATIENT
Start: 2023-10-28 | End: 2023-10-28

## 2023-10-28 RX ORDER — SODIUM CHLORIDE 9 MG/ML
1000 INJECTION INTRAMUSCULAR; INTRAVENOUS; SUBCUTANEOUS ONCE
Refills: 0 | Status: COMPLETED | OUTPATIENT
Start: 2023-10-28 | End: 2023-10-28

## 2023-10-28 RX ORDER — METOCLOPRAMIDE HCL 10 MG
10 TABLET ORAL ONCE
Refills: 0 | Status: COMPLETED | OUTPATIENT
Start: 2023-10-28 | End: 2023-10-28

## 2023-10-28 RX ADMIN — SODIUM CHLORIDE 1000 MILLILITER(S): 9 INJECTION INTRAMUSCULAR; INTRAVENOUS; SUBCUTANEOUS at 19:30

## 2023-10-28 RX ADMIN — Medication 15 MILLIGRAM(S): at 16:42

## 2023-10-28 RX ADMIN — Medication 100 GRAM(S): at 15:38

## 2023-10-28 RX ADMIN — SODIUM CHLORIDE 1000 MILLILITER(S): 9 INJECTION INTRAMUSCULAR; INTRAVENOUS; SUBCUTANEOUS at 15:37

## 2023-10-28 RX ADMIN — Medication 10 MILLIGRAM(S): at 15:38

## 2023-10-28 RX ADMIN — FAMOTIDINE 20 MILLIGRAM(S): 10 INJECTION INTRAVENOUS at 15:38

## 2023-10-28 NOTE — CONSULT NOTE ADULT - ASSESSMENT
28 years old female with a PMHx of ADD, VWD?, and recent occupational needlestick exposure s/p HAART, presented to the ED complaining of headache, vertigo, and generalized weakness for the last 2 days. Patient is an ED resident at [facility], and recently suffered a fingerstick injury while in the emergency department for which she has been on postexposure prophylaxis for HIV with Truvada & Iscentris (course completed now). She reports experiencing a vague sense of malaise and headaches since starting PEP. On 10/22 while at work she started feeling "brain fog" followed by pain in her L trapezius which progressed to LUE weakness while at work. A code stroke was called, and she was noted to have mild weakness on L wrist flexion an extension. CTH, and CTA were negative, and TNK was deferred. Patient was discharged same day and symptoms were attributed to possible complex migraine. Of note,  Pt's mother has a long history of complex migraines with similar "brain fog" symptoms. Patient returned to work the day after but she continued to experience "brain fog" resulting in her misplacing orders and having difficulties carrying out her duties. She was sent home by her attendings the same day. Patient has been home for the last 6 days and reported having intermittent unilateral headaches with occasional photophobia, and feeling very tired throughout the day. Two days ago she went to walk her dog and felt unsteady on her feet. These symptoms did not fully resolved. Today she woke up with a headache and was afraid to stand up over fear of collapsing as she felt weak. She reports feeling so weak that was "unable to  a fork to eat" which prompted her to come to the ED. Patient has an outpatient appointment with a neurologist next week.     Patient was given IV  Reglan, magnesium, and Toradol in the ED with significant improvement of her symptoms. At the time of the interview, patient did not display and focal neurological signs. Strenght was and balanced were preserved. Patient endorsed feeling "mentally foggy" and feeling a sense of "fullness" in her eyes without any pain on eye movement. Pt's mother was present bedside and reported that her migraines started at around age 28, and course with symptoms very similar to what her daughter is experiencing at this time.     Impression:  Recurrent unilateral headaches accompanied with photophobia and subjective feelings of unsteadiness and "brain fog". Exam is non-focal, and her recent neuroimaging was negative for acute stroke. Patient has a strong family history of migraines and has been recently subjected to emotional stressors due to her needlestick injury and PEP therapy. Her presentation is suggestive of new onset complex migraines. There is no family history of autoimmune disorders.       Plan:  - Obtain head and neck MRI w/o contrast.   - If no abnormal MRI results, ok to discharge from a neurological standpoint.   - Advise to follow up with her neurologist next week as planned.          28 years old female with a PMHx of ADD, VWD?, and recent occupational needlestick exposure s/p HAART, presented to the ED complaining of headache, vertigo, and generalized weakness for the last 2 days. Patient is an ED resident at [facility], and recently suffered a fingerstick injury while in the emergency department for which she has been on postexposure prophylaxis for HIV with Truvada & Iscentris (course completed now). She reports experiencing a vague sense of malaise and headaches since starting PEP. On 10/22 while at work she started feeling "brain fog" followed by pain in her L trapezius which progressed to LUE weakness while at work. A code stroke was called, and she was noted to have mild weakness on L wrist flexion an extension. CTH, and CTA were negative, and TNK was deferred. Patient was discharged same day and symptoms were attributed to possible complex migraine. Of note,  Pt's mother has a long history of complex migraines with similar "brain fog" symptoms. Patient returned to work the day after but she continued to experience "brain fog" resulting in her misplacing orders and having difficulties carrying out her duties. She was sent home by her attendings the same day. Patient has been home for the last 6 days and reported having intermittent unilateral headaches with occasional photophobia, and feeling very tired throughout the day. Two days ago she went to walk her dog and felt unsteady on her feet. These symptoms did not fully resolved. Today she woke up with a headache and was afraid to stand up over fear of collapsing as she felt weak. She reports feeling so weak that was "unable to  a fork to eat" which prompted her to come to the ED. Patient has an outpatient appointment with a neurologist next week.     Patient was given IV  Reglan, magnesium, and Toradol in the ED with significant improvement of her symptoms. At the time of the interview, patient did not display and focal neurological signs. Strenght was and balanced were preserved. Patient endorsed feeling "mentally foggy" and feeling a sense of "fullness" in her eyes without any pain on eye movement. Pt's mother was present bedside and reported that her migraines started at around age 28, and course with symptoms very similar to what her daughter is experiencing at this time.     Impression:  Recurrent unilateral headaches accompanied with photophobia and subjective feelings of unsteadiness and "brain fog". Exam is non-focal, and her recent neuroimaging was negative for acute stroke. Patient has a strong family history of migraines and has been recently subjected to emotional stressors due to her needlestick injury and PEP therapy. Her presentation is suggestive of new onset complex migraines. There is no family history of autoimmune disorders.       Plan:  - Obtain head and neck MRI w/o contrast.   - If no abnormal MRI results, ok to discharge from a neurological standpoint.   - Advise to follow up with her neurologist next week as planned.          28 years old female with a PMHx of ADD, VWD?, and recent occupational needlestick exposure s/p HAART, presented to the ED complaining of headache, vertigo, and generalized weakness for the last 2 days. Patient is an ED resident at [facility], and recently suffered a fingerstick injury while in the emergency department for which she has been on postexposure prophylaxis for HIV with Truvada & Iscentris (course completed now). She reports experiencing a vague sense of malaise and headaches since starting PEP. On 10/22 while at work she started feeling "brain fog" followed by pain in her L trapezius which progressed to LUE weakness while at work. A code stroke was called, and she was noted to have mild weakness on L wrist flexion an extension. CTH, and CTA were negative, and TNK was deferred. Patient was discharged same day and symptoms were attributed to possible complex migraine. Of note,  Pt's mother has a long history of complex migraines with similar "brain fog" symptoms. Patient returned to work the day after but she continued to experience "brain fog" resulting in her misplacing orders and having difficulties carrying out her duties. She was sent home by her attendings the same day. Patient has been home for the last 6 days and reported having intermittent unilateral headaches with occasional photophobia, and feeling very tired throughout the day. Two days ago she went to walk her dog and felt unsteady on her feet. These symptoms did not fully resolved. Today she woke up with a headache and was afraid to stand up over fear of collapsing as she felt weak. She reports feeling so weak that was "unable to  a fork to eat" which prompted her to come to the ED. Patient has an outpatient appointment with a neurologist next week.     Patient was given IV  Reglan, magnesium, and Toradol in the ED with significant improvement of her symptoms. At the time of the interview, patient did not display and focal neurological signs. Strenght was and balanced were preserved. Patient endorsed feeling "mentally foggy" and feeling a sense of "fullness" in her eyes without any pain on eye movement. Pt's mother was present bedside and reported that her migraines started at around age 28, and course with symptoms very similar to what her daughter is experiencing at this time.     Impression:  Recurrent unilateral headaches accompanied with photophobia and subjective feelings of unsteadiness and "brain fog". Exam is non-focal, and her recent neuroimaging was negative for acute stroke. Patient has a strong family history of migraines and has been recently subjected to emotional stressors due to her needlestick injury and PEP therapy. Her presentation is suggestive of new onset complex migraines. There is no family history of autoimmune disorders.       Plan:  - Obtain head and neck MRI w/o contrast.   - If no abnormal MRI results, ok to discharge from a neurological standpoint.   - Advise to follow up with her neurologist next week as planned.

## 2023-10-28 NOTE — CONSULT NOTE ADULT - SUBJECTIVE AND OBJECTIVE BOX
Neurology consult    ELI DIANE 28yFemale    HPI:  28 years old female with a PMHx of ADD, VWD?, and recent occupational needlestick exposure s/p HAART, presented to the ED complaining of headache, vertigo, and generalized weakness for the last 2 days. Patient is an ED resident at [facility], and recently suffered a fingerstick injury while in the emergency department for which she has been on postexposure prophylaxis for HIV with Truvada & Iscentris (course completed now). She reports experiencing a vague sense of malaise and headaches since starting PEP. On 10/22 while at work she started feeling "brain fog" followed by pain in her L trapezius which progressed to LUE weakness while at work. A code stroke was called, and she was noted to have mild weakness on L wrist flexion an extension. CTH, and CTA were negative, and TNK was deferred. Patient was discharged same day and symptoms were attributed to possible complex migraine. Of note,  Pt's mother has a long history of complex migraines with similar "brain fog" symptoms. Patient returned to work the day after but she continued to experience "brain fog" resulting in her misplacing orders and having difficulties carrying out her duties. She was sent home by her attendings the same day.     Patient has been home for the last 6 days and reported having intermittent headaches, and feeling very tired throughout the day. Two days ago she went to walk her dog and felt unsteady on her feet. These symptoms did not fully resolved. Today she woke up with a headache and was afraid to stand up over fear of collapsing as she felt weak. She reports feeling so weak that was "unable to  a fork to eat" which prompted her to come to the ED. Patient has an outpatient appointment with a neurologist next week.          Family history: No history of dementia, strokes, or seizures   FAMILY HISTORY:    SOCIAL HISTORY -- No history of tobacco or alcohol use     Allergies    No Known Allergies    Intolerances        Height (cm): 170.2 (10-28 @ 15:05)  Weight (kg): 61.2 (10-28 @ 15:05)  BMI (kg/m2): 21.1 (10-28 @ 15:05)    Vital Signs Last 24 Hrs  T(C): 37 (28 Oct 2023 15:05), Max: 37 (28 Oct 2023 15:05)  T(F): 98.6 (28 Oct 2023 15:05), Max: 98.6 (28 Oct 2023 15:05)  HR: 73 (28 Oct 2023 15:05) (73 - 73)  BP: 112/78 (28 Oct 2023 15:05) (112/78 - 112/78)  BP(mean): --  RR: 18 (28 Oct 2023 15:05) (18 - 18)  SpO2: 98% (28 Oct 2023 15:05) (98% - 98%)    Parameters below as of 28 Oct 2023 15:05  Patient On (Oxygen Delivery Method): room air      NEUROLOGICAL EXAMINATION:  GENERAL:  Appearance is consistent with chronologic age. Patient in a dark room, but appeared comfortable and was able to tolerate the exam without problems.   COGNITION/LANGUAGE:  Awake, alert, and oriented to person, place, time and date.  Fluent, intact comprehension, repetition, naming. Recent and remote memory intact.  Fund of knowledge is appropriate.  Nondysarthric.    CRANIAL NERVES:   - Eyes:  Visual acuity intact. Pupils equal round and reactive, no RAPD. EOMI w/o nystagmus, skew or reported double vision. Normal visual field on confrontation. No ptosis/weakness of eyelid closure.   - Face:  Facial sensation normal V1 - 3, no facial asymmetry.    - Ears/Nose/Throat:  Palate elevates midline.  Tongue and uvula midline.   MOTOR EXAM:  (R/L) 5/5 UE; 5/5 LE.  No observable drift. Normal tone and bulk. No tenderness, twitching, tremors or involuntary movements.  SENSORY EXAM:  Intact to light touch and pinprick, pain, temperature and proprioception and vibration in all extremities.  REFLEXES:   2+ b/l biceps, triceps, and achilles, 1+patellar b/l (baseline according to the pt).  Plantar response downgoing b/l.   CEREBELLUM:  Finger to nose intact.  No dysmetria.    GAIT: narrow based and normal.  Romberg: negative.       LABS:  CBC Full  -  ( 28 Oct 2023 15:36 )  WBC Count : 7.51 K/uL  RBC Count : 4.45 M/uL  Hemoglobin : 14.3 g/dL  Hematocrit : 41.4 %  Platelet Count - Automated : 246 K/uL  Mean Cell Volume : 93.0 fl  Mean Cell Hemoglobin : 32.1 pg  Mean Cell Hemoglobin Concentration : 34.5 gm/dL  Auto Neutrophil # : 4.87 K/uL  Auto Lymphocyte # : 1.90 K/uL  Auto Monocyte # : 0.49 K/uL  Auto Eosinophil # : 0.17 K/uL  Auto Basophil # : 0.05 K/uL  Auto Neutrophil % : 64.8 %  Auto Lymphocyte % : 25.3 %  Auto Monocyte % : 6.5 %  Auto Eosinophil % : 2.3 %  Auto Basophil % : 0.7 %    Urinalysis Basic - ( 28 Oct 2023 15:36 )    Color: x / Appearance: x / SG: x / pH: x  Gluc: 117 mg/dL / Ketone: x  / Bili: x / Urobili: x   Blood: x / Protein: x / Nitrite: x   Leuk Esterase: x / RBC: x / WBC x   Sq Epi: x / Non Sq Epi: x / Bacteria: x      10-28    141  |  104  |  9   ----------------------------<  117<H>  4.2   |  27  |  0.75    Ca    9.4      28 Oct 2023 15:36  Mg     2.1     10-28    TPro  6.7  /  Alb  4.2  /  TBili  0.3  /  DBili  x   /  AST  17  /  ALT  15  /  AlkPhos  38<L>  10-28    Hemoglobin A1C:     LIVER FUNCTIONS - ( 28 Oct 2023 15:36 )  Alb: 4.2 g/dL / Pro: 6.7 g/dL / ALK PHOS: 38 U/L / ALT: 15 U/L / AST: 17 U/L / GGT: x           Vitamin B12                              Neurology consult    ELI DIANE 28yFemale    HPI:  28 years old female with a PMHx of ADD, VWD?, and recent occupational needlestick exposure s/p HAART, presented to the ED complaining of headache, vertigo, and generalized weakness for the last 2 days. Patient is an ED resident at [facility], and recently suffered a fingerstick injury while in the emergency department for which she has been on postexposure prophylaxis for HIV with Truvada & Iscentris (course completed now). She reports experiencing a vague sense of malaise and headaches since starting PEP. On 10/22 while at work she started feeling "brain fog" followed by pain in her L trapezius which progressed to LUE weakness while at work. A code stroke was called, and she was noted to have mild weakness on L wrist flexion an extension. CTH, and CTA were negative, and TNK was deferred. Patient was discharged same day and symptoms were attributed to possible complex migraine. Of note,  Pt's mother has a long history of complex migraines with similar "brain fog" symptoms. Patient returned to work the day after but she continued to experience "brain fog" resulting in her misplacing orders and having difficulties carrying out her duties. She was sent home by her attendings the same day.     Patient has been home for the last 6 days and reported having intermittent headaches, and feeling very tired throughout the day. Two days ago she went to walk her dog and felt unsteady on her feet. These symptoms did not fully resolved. Today she woke up with a headache and was afraid to stand up over fear of collapsing as she felt weak. She reports feeling so weak that was "unable to  a fork to eat" which prompted her to come to the ED. Patient has an outpatient appointment with a neurologist next week.          Family history: No history of dementia, strokes, or seizures   FAMILY HISTORY:    SOCIAL HISTORY -- No history of tobacco or alcohol use     Allergies    No Known Allergies    Intolerances        Height (cm): 170.2 (10-28 @ 15:05)  Weight (kg): 61.2 (10-28 @ 15:05)  BMI (kg/m2): 21.1 (10-28 @ 15:05)    Vital Signs Last 24 Hrs  T(C): 37 (28 Oct 2023 15:05), Max: 37 (28 Oct 2023 15:05)  T(F): 98.6 (28 Oct 2023 15:05), Max: 98.6 (28 Oct 2023 15:05)  HR: 73 (28 Oct 2023 15:05) (73 - 73)  BP: 112/78 (28 Oct 2023 15:05) (112/78 - 112/78)  BP(mean): --  RR: 18 (28 Oct 2023 15:05) (18 - 18)  SpO2: 98% (28 Oct 2023 15:05) (98% - 98%)    Parameters below as of 28 Oct 2023 15:05  Patient On (Oxygen Delivery Method): room air      NEUROLOGICAL EXAMINATION:  GENERAL:  Appearance is consistent with chronologic age. Patient in a dark room, but appeared comfortable and was able to tolerate the exam without problems.   COGNITION/LANGUAGE:  Awake, alert, and oriented to person, place, time and date.  Fluent, intact comprehension, repetition, naming. Recent and remote memory intact.  Fund of knowledge is appropriate.  Nondysarthric.    CRANIAL NERVES:   - Eyes:  Visual acuity intact. Pupils equal round and reactive, no RAPD. EOMI w/o nystagmus, skew or reported double vision. Normal visual field on confrontation. No ptosis/weakness of eyelid closure.   - Face:  Facial sensation normal V1 - 3, no facial asymmetry.    - Ears/Nose/Throat:  Palate elevates midline.  Tongue and uvula midline.   MOTOR EXAM:  (R/L) 5/5 UE; 5/5 LE.  No observable drift. Normal tone and bulk. No tenderness, twitching, tremors or involuntary movements.  SENSORY EXAM:  Intact to light touch and pinprick, pain, temperature and proprioception and vibration in all extremities.  REFLEXES:   2+ b/l biceps, triceps, and achilles, 1+patellar b/l (baseline according to the pt).  Plantar response downgoing b/l.   CEREBELLUM:  Finger to nose intact.  No dysmetria.    GAIT: narrow based and normal.  Romberg: negative.       LABS:  CBC Full  -  ( 28 Oct 2023 15:36 )  WBC Count : 7.51 K/uL  RBC Count : 4.45 M/uL  Hemoglobin : 14.3 g/dL  Hematocrit : 41.4 %  Platelet Count - Automated : 246 K/uL  Mean Cell Volume : 93.0 fl  Mean Cell Hemoglobin : 32.1 pg  Mean Cell Hemoglobin Concentration : 34.5 gm/dL  Auto Neutrophil # : 4.87 K/uL  Auto Lymphocyte # : 1.90 K/uL  Auto Monocyte # : 0.49 K/uL  Auto Eosinophil # : 0.17 K/uL  Auto Basophil # : 0.05 K/uL  Auto Neutrophil % : 64.8 %  Auto Lymphocyte % : 25.3 %  Auto Monocyte % : 6.5 %  Auto Eosinophil % : 2.3 %  Auto Basophil % : 0.7 %    Urinalysis Basic - ( 28 Oct 2023 15:36 )    Color: x / Appearance: x / SG: x / pH: x  Gluc: 117 mg/dL / Ketone: x  / Bili: x / Urobili: x   Blood: x / Protein: x / Nitrite: x   Leuk Esterase: x / RBC: x / WBC x   Sq Epi: x / Non Sq Epi: x / Bacteria: x      10-28    141  |  104  |  9   ----------------------------<  117<H>  4.2   |  27  |  0.75    Ca    9.4      28 Oct 2023 15:36  Mg     2.1     10-28    TPro  6.7  /  Alb  4.2  /  TBili  0.3  /  DBili  x   /  AST  17  /  ALT  15  /  AlkPhos  38<L>  10-28    Hemoglobin A1C:     LIVER FUNCTIONS - ( 28 Oct 2023 15:36 )  Alb: 4.2 g/dL / Pro: 6.7 g/dL / ALK PHOS: 38 U/L / ALT: 15 U/L / AST: 17 U/L / GGT: x           Vitamin B12                              Neurology consult    ELI DIANE 28yFemale    HPI:  28 years old female with a PMHx of ADD, VWD?, and recent occupational needlestick exposure s/p HAART, presented to the ED complaining of headache, vertigo, and generalized weakness for the last 2 days. Patient is an ED resident at [facility], and recently suffered a fingerstick injury while in the emergency department for which she has been on postexposure prophylaxis for HIV with Truvada & Iscentris (course completed now). She reports experiencing a vague sense of malaise and headaches since starting PEP. On 10/22 while at work she started feeling "brain fog" followed by pain in her L trapezius which progressed to LUE weakness while at work. A code stroke was called, and she was noted to have mild weakness on L wrist flexion an extension. CTH, and CTA were negative, and TNK was deferred. Patient was discharged same day and symptoms were attributed to possible complex migraine. Of note,  Pt's mother has a long history of complex migraines with similar "brain fog" symptoms. Patient returned to work the day after but she continued to experience "brain fog" resulting in her misplacing orders and having difficulties carrying out her duties. She was sent home by her attendings the same day.     Patient has been home for the last 6 days and reported having intermittent headaches, and feeling very tired throughout the day. Two days ago she went to walk her dog and felt unsteady on her feet. These symptoms did not fully resolved. Today she woke up with a headache and was afraid to stand up over fear of collapsing as she felt weak. She reports feeling so weak that was "unable to  a fork to eat" which prompted her to come to the ED. Patient has an outpatient appointment with a neurologist next week.          Family history: No history of dementia, strokes, or seizures   FAMILY HISTORY:    SOCIAL HISTORY -- No history of tobacco or alcohol use     Allergies    No Known Allergies    Intolerances        Height (cm): 170.2 (10-28 @ 15:05)  Weight (kg): 61.2 (10-28 @ 15:05)  BMI (kg/m2): 21.1 (10-28 @ 15:05)    Vital Signs Last 24 Hrs  T(C): 37 (28 Oct 2023 15:05), Max: 37 (28 Oct 2023 15:05)  T(F): 98.6 (28 Oct 2023 15:05), Max: 98.6 (28 Oct 2023 15:05)  HR: 73 (28 Oct 2023 15:05) (73 - 73)  BP: 112/78 (28 Oct 2023 15:05) (112/78 - 112/78)  BP(mean): --  RR: 18 (28 Oct 2023 15:05) (18 - 18)  SpO2: 98% (28 Oct 2023 15:05) (98% - 98%)    Parameters below as of 28 Oct 2023 15:05  Patient On (Oxygen Delivery Method): room air      NEUROLOGICAL EXAMINATION:  GENERAL:  Appearance is consistent with chronologic age. Patient in a dark room, but appeared comfortable and was able to tolerate the exam without problems.   COGNITION/LANGUAGE:  Awake, alert, and oriented to person, place, time and date.  Fluent, intact comprehension, repetition, naming. Recent and remote memory intact.  Fund of knowledge is appropriate.  Nondysarthric.    CRANIAL NERVES:   - Eyes:  Visual acuity intact. Pupils equal round and reactive, no RAPD. EOMI w/o nystagmus, skew or reported double vision. Normal visual field on confrontation. No ptosis/weakness of eyelid closure.   - Face:  Facial sensation normal V1 - 3, no facial asymmetry.    - Ears/Nose/Throat:  Palate elevates midline.  Tongue and uvula midline.   MOTOR EXAM:  (R/L) 5/5 UE; 5/5 LE.  No observable drift. Normal tone and bulk. No tenderness, twitching, tremors or involuntary movements.  SENSORY EXAM:  Intact to light touch and pinprick, pain, temperature and proprioception and vibration in all extremities.  REFLEXES:   2+ b/l biceps, triceps, and achilles, 1+patellar b/l (baseline according to the pt).  Plantar response downgoing b/l.   CEREBELLUM:  Finger to nose intact.  No dysmetria.    GAIT: narrow based and normal.  Romberg: negative.       LABS:  CBC Full  -  ( 28 Oct 2023 15:36 )  WBC Count : 7.51 K/uL  RBC Count : 4.45 M/uL  Hemoglobin : 14.3 g/dL  Hematocrit : 41.4 %  Platelet Count - Automated : 246 K/uL  Mean Cell Volume : 93.0 fl  Mean Cell Hemoglobin : 32.1 pg  Mean Cell Hemoglobin Concentration : 34.5 gm/dL  Auto Neutrophil # : 4.87 K/uL  Auto Lymphocyte # : 1.90 K/uL  Auto Monocyte # : 0.49 K/uL  Auto Eosinophil # : 0.17 K/uL  Auto Basophil # : 0.05 K/uL  Auto Neutrophil % : 64.8 %  Auto Lymphocyte % : 25.3 %  Auto Monocyte % : 6.5 %  Auto Eosinophil % : 2.3 %  Auto Basophil % : 0.7 %    Urinalysis Basic - ( 28 Oct 2023 15:36 )    Color: x / Appearance: x / SG: x / pH: x  Gluc: 117 mg/dL / Ketone: x  / Bili: x / Urobili: x   Blood: x / Protein: x / Nitrite: x   Leuk Esterase: x / RBC: x / WBC x   Sq Epi: x / Non Sq Epi: x / Bacteria: x      10-28    141  |  104  |  9   ----------------------------<  117<H>  4.2   |  27  |  0.75    Ca    9.4      28 Oct 2023 15:36  Mg     2.1     10-28    TPro  6.7  /  Alb  4.2  /  TBili  0.3  /  DBili  x   /  AST  17  /  ALT  15  /  AlkPhos  38<L>  10-28    Hemoglobin A1C:     LIVER FUNCTIONS - ( 28 Oct 2023 15:36 )  Alb: 4.2 g/dL / Pro: 6.7 g/dL / ALK PHOS: 38 U/L / ALT: 15 U/L / AST: 17 U/L / GGT: x           Vitamin B12

## 2023-10-28 NOTE — ED ADULT TRIAGE NOTE - CHIEF COMPLAINT QUOTE
Patient with no PMH to the ED c/o intermittent headache/migraines with arm numbness but no vision changes x 1 week, increased weakness x 2 days. Recently completed PEP therapy last week. CANDIE-, AAOX4, NAD.

## 2023-10-28 NOTE — ED ADULT NURSE NOTE - NSFALLUNIVINTERV_ED_ALL_ED
Bed/Stretcher in lowest position, wheels locked, appropriate side rails in place/Call bell, personal items and telephone in reach/Instruct patient to call for assistance before getting out of bed/chair/stretcher/Non-slip footwear applied when patient is off stretcher/[place] to call system/Physically safe environment - no spills, clutter or unnecessary equipment/Purposeful proactive rounding/Room/bathroom lighting operational, light cord in reach Bed/Stretcher in lowest position, wheels locked, appropriate side rails in place/Call bell, personal items and telephone in reach/Instruct patient to call for assistance before getting out of bed/chair/stretcher/Non-slip footwear applied when patient is off stretcher/[place] to call system/Physically safe environment - no spills, clutter or unnecessary equipment/Purposeful proactive rounding/Room/bathroom lighting operational, light cord in reach Bed/Stretcher in lowest position, wheels locked, appropriate side rails in place/Call bell, personal items and telephone in reach/Instruct patient to call for assistance before getting out of bed/chair/stretcher/Non-slip footwear applied when patient is off stretcher/[place] to call system/Physically safe environment - no spills, clutter or unnecessary equipment/Purposeful proactive rounding/Room/bathroom lighting operational, light cord in reach

## 2023-10-28 NOTE — ED PROVIDER NOTE - NSFOLLOWUPINSTRUCTIONS_ED_ALL_ED_FT
You were seen in the Emergency Department for: headache    For pain, you may take Tylenol (acetaminophen) 975 mg every 6 hours, AND/OR Advil (ibuprofen) 600 mg every 8 hours.    Please follow up with your primary physician and neurologist as planned. If you do not have a primary physician or specialist of your needs, please call [phone] to find one convenient for you. At this number you will be able to locate a provider who accepts your insurance, as well as locate the right specialist for your needs.    You should return to the Emergency Department if you feel any new/worsening/persistent symptoms including but not limited to: fever, chills, neck stiffness, chest pain, difficulty breathing, loss of consciousness, bleeding, uncontrolled pain, numbness/weakness of a body part You were seen in the Emergency Department for: headache    For pain, you may take Tylenol (acetaminophen) 975 mg every 6 hours, AND/OR Advil (ibuprofen) 600 mg every 8 hours.    Please follow up with your primary physician and neurologist as planned. If you do not have a primary physician or specialist of your needs, please call [phone] to find one convenient for you. At this number you will be able to locate a provider who accepts your insurance, as well as locate the right specialist for your needs.    You should return to the Emergency Department if you feel any new/worsening/persistent symptoms including but not limited to: fever, chills, neck stiffness, chest pain, difficulty breathing, loss of consciousness, bleeding, uncontrolled pain, numbness/weakness of a body part You were seen in the Emergency Department for: headache    For pain, you may take Tylenol (acetaminophen) 975 mg every 6 hours, AND/OR Advil (ibuprofen) 600 mg every 8 hours.    Please follow up with your primary physician and neurologist as planned. If you do not have a primary physician or specialist of your needs, please call [phone] to find one convenient for you. At this number you will be able to locate a provider who accepts your insurance, as well as locate the right specialist for your needs.    You should return to the Emergency Department if you feel any new/worsening/persistent symptoms including but not limited to: fever, chills, neck stiffness, chest pain, difficulty breathing, loss of consciousness, bleeding, uncontrolled pain, numbness/weakness of a body part

## 2023-10-28 NOTE — ED ADULT NURSE NOTE - COVID-19 ORDERING FACILITY
[facility]  - [facility]  - [facility]  - [facility]

## 2023-10-28 NOTE — CONSULT NOTE ADULT - NSCONSULTADDITIONALINFOA_GEN_ALL_CORE
warm Attending: pt not seen as was dc'd from ED before rounds. work up at [facility] unrevealing. check brain and c spine mri. f/u with [facility] specialist in several days if MRIs are unrevealing. discussed with neuro res Attending: pt not seen as was dc'd from ED before rounds. work up at [facility] unrevealing. check brain and c spine mri. f/u with [facility] specialist in several days if MRIs are unrevealing. discussed with neuro res Attending: pt not seen as was dc'd from ED before rounds. work up at [facility] unrevealing. check brain and c spine mri. f/u with [facility] specialist in several days if MRIs are unrevealing. discussed with neuro res

## 2023-10-28 NOTE — ED PROVIDER NOTE - PHYSICAL EXAMINATION
Gen - No acute distress, appears comfortable, at full mentation  HEENT - NCAT, EOMI, PERRL 3mm   Resp - even chest rise, no WOB  Abd - nondistended  MSK - no gross deformities  Extrem - no LE edema  Neuro - full motor strength and sensation to LT throughout  Skin - warm, well perfused

## 2023-10-28 NOTE — ED ADULT NURSE NOTE - OBJECTIVE STATEMENT
Pt alert, oriented, and ambulatory at time of assessment. Pt reports onset of headache/neck pain/subjective fevers/and full body tingling beginning x 1 week ago. Tingling has resolved but headache and photophobia persist at time of assessment. Pt denies chest pain/SOB. Pt reports weakness/dizziness worse with physical movement but denies discrete syncope. Pt denies dysuria/N/V/D/abdominal pain.

## 2023-10-28 NOTE — ED PROVIDER NOTE - PROGRESS NOTE DETAILS
Matt Murray MD: Patient seen by neurology--likely complex migraine--recommended MR head/c-spine w/wo contrast--both are negative. Cleared for dc with outpatient f/u as planned in 2d. DC.

## 2023-10-28 NOTE — ED PROVIDER NOTE - OBJECTIVE STATEMENT
28 year old female, no reported pmh, ER resident @ [facility], recent needle stick exposure s/p 28d PEP regimen, presenting with headache and generalized weakness. States for the past week having persistent headaches with associated brain fog, malaise, and an episode of LUE numbness, has been taking tylenol and Ubrelvy with some relief and has appt to see neurologist Dr. Moseley but today felt so weak she was unable to stand so came to ED. Also reports some burning discomfort to stomach. 28 year old female, no reported pmh, ER resident @ [facility], recent needle stick exposure s/p 28d PEP regimen, presenting with headache and generalized weakness. States for the past week having persistent headaches with associated brain fog, malaise, and an episode of LUE numbness, has been taking tylenol and Ubrelvy with some relief and has appt to see neurologist Dr. Moseley but today felt so weak she was unable to stand so came to ED. Also reports some burning discomfort to stomach. 28 year old female, no reported pmh, ER resident @ [facility], recent needle stick exposure s/p 28d PEP regimen, presenting with headache and generalized weakness. States for the past week having persistent headaches with associated brain fog, malaise, and an episode of LUE numbness, has been taking tylenol and Ubrelvy with some relief and has appt to see neurologist Dr. Moseley but today felt so weak she was unable to stand so came to ED. Also reports some burning discomfort to stomach.

## 2023-10-28 NOTE — ED PROVIDER NOTE - COVID-19 ORDERING FACILITY
[facility]  - [facility]  - [facility]  - [facility]

## 2023-10-28 NOTE — ED PROVIDER NOTE - CLINICAL SUMMARY MEDICAL DECISION MAKING FREE TEXT BOX
28 year old very pleasant female, no reported pmh, ER resident @ [facility], recent needle stick exposure s/p 28d PEP regimen, presenting with headache and generalized weakness. Vitals wnl, mentating well, nonfocal neuro exam--suspect continued sequelae of complex migraine with possible viral syndrome component--low suspicion overall for SAH vs meningitis vs encephalitis vs pseudotumor. Will speak with neurology to expedite work up to include possible inpatient MRI. Migraine cocktail for now and basic labs work/ucg. 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, recent needle stick exposure s/p 28d PEP regimen, presenting with headache and generalized weakness. Vitals wnl, mentating well, nonfocal neuro exam--suspect continued sequelae of complex migraine with possible viral syndrome component--low suspicion overall for SAH vs meningitis vs encephalitis vs pseudotumor. Will speak with neurology to expedite work up to include possible inpatient MRI. Migraine cocktail for now and basic labs work/ucg. 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 Vitals wnl, mentating well, nonfocal neuro exam--suspect continued sequelae of complex migraine with possible viral syndrome component--low suspicion overall for SAH vs meningitis vs encephalitis vs pseudotumor. Will speak with neurology to expedite work up to include possible inpatient MRI. Migraine cocktail for now and basic labs work/ucg.

## 2023-10-28 NOTE — ED PROVIDER NOTE - PATIENT PORTAL LINK FT
You can access the FollowMyHealth Patient Portal offered by [facility] by registering at the following website: [email or web address]. By joining [employer]’s FollowMyHealth portal, you will also be able to view your health information using other applications (apps) compatible with our system. You can access the FollowMyHealth Patient Portal offered by [facility] by registering at the following website: [email or web address]. By joining [employer]’s FollowMyHealth portal, you will also be able to view your health information using other applications (apps) compatible with our system. You can access the FollowMyHealth Patient Portal offered by [facility] by registering at the following website: [email or web address]. By joining [employer]’s FollowMyHealth portal, you will also be able to view your health information using other applications (apps) compatible with our system.

## 2023-10-31 ENCOUNTER — APPOINTMENT (OUTPATIENT)
Dept: PAIN MANAGEMENT | Facility: CLINIC | Age: 28
End: 2023-10-31
Payer: COMMERCIAL

## 2023-10-31 VITALS
SYSTOLIC BLOOD PRESSURE: 119 MMHG | HEIGHT: 67 IN | WEIGHT: 135 LBS | BODY MASS INDEX: 21.19 KG/M2 | DIASTOLIC BLOOD PRESSURE: 71 MMHG | HEART RATE: 73 BPM

## 2023-10-31 DIAGNOSIS — R53.81 OTHER MALAISE: ICD-10-CM

## 2023-10-31 DIAGNOSIS — Z20.822 CONTACT WITH AND (SUSPECTED) EXPOSURE TO COVID-19: ICD-10-CM

## 2023-10-31 DIAGNOSIS — H53.149 VISUAL DISCOMFORT, UNSPECIFIED: ICD-10-CM

## 2023-10-31 DIAGNOSIS — Z20.6 CONTACT WITH AND (SUSPECTED) EXPOSURE TO HUMAN IMMUNODEFICIENCY VIRUS [HIV]: ICD-10-CM

## 2023-10-31 DIAGNOSIS — F98.8 OTHER SPECIFIED BEHAVIORAL AND EMOTIONAL DISORDERS WITH ONSET USUALLY OCCURRING IN CHILDHOOD AND ADOLESCENCE: ICD-10-CM

## 2023-10-31 DIAGNOSIS — R10.9 UNSPECIFIED ABDOMINAL PAIN: ICD-10-CM

## 2023-10-31 DIAGNOSIS — Z82.3 FAMILY HISTORY OF STROKE: ICD-10-CM

## 2023-10-31 DIAGNOSIS — R53.1 WEAKNESS: ICD-10-CM

## 2023-10-31 DIAGNOSIS — R53.83 OTHER FATIGUE: ICD-10-CM

## 2023-10-31 DIAGNOSIS — Z80.3 FAMILY HISTORY OF MALIGNANT NEOPLASM OF BREAST: ICD-10-CM

## 2023-10-31 DIAGNOSIS — R51.9 HEADACHE, UNSPECIFIED: ICD-10-CM

## 2023-10-31 DIAGNOSIS — R29.898 OTHER SYMPTOMS AND SIGNS INVOLVING THE MUSCULOSKELETAL SYSTEM: ICD-10-CM

## 2023-10-31 DIAGNOSIS — Z82.49 FAMILY HISTORY OF ISCHEMIC HEART DISEASE AND OTHER DISEASES OF THE CIRCULATORY SYSTEM: ICD-10-CM

## 2023-10-31 DIAGNOSIS — Z82.0 FAMILY HISTORY OF EPILEPSY AND OTHER DISEASES OF THE NERVOUS SYSTEM: ICD-10-CM

## 2023-10-31 DIAGNOSIS — R42 DIZZINESS AND GIDDINESS: ICD-10-CM

## 2023-10-31 PROCEDURE — 99204 OFFICE O/P NEW MOD 45 MIN: CPT

## 2023-10-31 RX ORDER — METOCLOPRAMIDE 10 MG/1
10 TABLET ORAL
Qty: 50 | Refills: 3 | Status: ACTIVE | COMMUNITY
Start: 2023-10-31 | End: 1900-01-01

## 2023-10-31 RX ORDER — CELECOXIB 200 MG/1
200 CAPSULE ORAL DAILY
Qty: 30 | Refills: 1 | Status: ACTIVE | COMMUNITY
Start: 2023-10-31 | End: 1900-01-01

## 2023-11-01 ENCOUNTER — APPOINTMENT (OUTPATIENT)
Dept: INFECTIOUS DISEASE | Facility: CLINIC | Age: 28
End: 2023-11-01
Payer: COMMERCIAL

## 2023-11-01 PROCEDURE — 99212 OFFICE O/P EST SF 10 MIN: CPT

## 2023-11-01 RX ORDER — RITONAVIR 100 MG 100 MG/1
100 TABLET ORAL
Qty: 30 | Refills: 0 | Status: DISCONTINUED | COMMUNITY
Start: 2023-10-03 | End: 2023-11-01

## 2023-11-01 RX ORDER — RALTEGRAVIR 400 MG/1
400 TABLET, FILM COATED ORAL TWICE DAILY
Qty: 42 | Refills: 0 | Status: DISCONTINUED | COMMUNITY
Start: 2023-10-01 | End: 2023-11-01

## 2023-11-01 RX ORDER — DARUNAVIR 800 MG 800 MG/1
800 TABLET ORAL
Qty: 30 | Refills: 0 | Status: DISCONTINUED | COMMUNITY
Start: 2023-10-03 | End: 2023-11-01

## 2023-11-02 LAB
ALBUMIN SERPL ELPH-MCNC: 4.2 G/DL
ALP BLD-CCNC: 39 U/L
ALT SERPL-CCNC: 14 U/L
ANION GAP SERPL CALC-SCNC: 9 MMOL/L
AST SERPL-CCNC: 16 U/L
BILIRUB SERPL-MCNC: 0.2 MG/DL
BUN SERPL-MCNC: 14 MG/DL
CALCIUM SERPL-MCNC: 9.1 MG/DL
CHLORIDE SERPL-SCNC: 104 MMOL/L
CO2 SERPL-SCNC: 27 MMOL/L
CREAT SERPL-MCNC: 0.72 MG/DL
EGFR: 117 ML/MIN/1.73M2
GLUCOSE SERPL-MCNC: 67 MG/DL
HCV RNA SERPL NAA+PROBE-LOG IU: NOT DETECTED LOGIU/ML
HEPC RNA INTERP: NOT DETECTED
HIV1+2 AB SPEC QL IA.RAPID: NONREACTIVE
POTASSIUM SERPL-SCNC: 4.4 MMOL/L
PROT SERPL-MCNC: 6.3 G/DL
SODIUM SERPL-SCNC: 139 MMOL/L

## 2023-11-13 PROBLEM — Z20.6 EXPOSURE TO HIV: Status: ACTIVE | Noted: 2023-09-27

## 2023-11-13 PROBLEM — R51.9 GENERALIZED HEADACHE: Status: ACTIVE | Noted: 2023-11-13

## 2023-11-16 RX ORDER — ACYCLOVIR 400 MG/1
400 TABLET ORAL
Qty: 60 | Refills: 2 | Status: ACTIVE | COMMUNITY
Start: 2021-07-19 | End: 1900-01-01

## 2023-12-26 ENCOUNTER — APPOINTMENT (OUTPATIENT)
Dept: CARDIOLOGY | Facility: CLINIC | Age: 28
End: 2023-12-26

## 2023-12-26 RX ORDER — FLUCONAZOLE 150 MG/1
150 TABLET ORAL DAILY
Qty: 3 | Refills: 1 | Status: ACTIVE | COMMUNITY
Start: 2023-12-26 | End: 1900-01-01

## 2023-12-26 RX ORDER — NITROFURANTOIN (MONOHYDRATE/MACROCRYSTALS) 25; 75 MG/1; MG/1
100 CAPSULE ORAL
Qty: 14 | Refills: 0 | Status: ACTIVE | COMMUNITY
Start: 2023-12-26 | End: 1900-01-01

## 2023-12-28 DIAGNOSIS — N76.0 ACUTE VAGINITIS: ICD-10-CM

## 2023-12-28 RX ORDER — CLOTRIMAZOLE AND BETAMETHASONE DIPROPIONATE 10; .5 MG/G; MG/G
1-0.05 CREAM TOPICAL
Qty: 1 | Refills: 0 | Status: ACTIVE | COMMUNITY
Start: 2023-12-28 | End: 1900-01-01

## 2023-12-28 RX ORDER — FLUCONAZOLE 150 MG/1
150 TABLET ORAL
Qty: 3 | Refills: 0 | Status: ACTIVE | COMMUNITY
Start: 2023-12-28 | End: 1900-01-01

## 2024-02-05 RX ORDER — NITROFURANTOIN (MONOHYDRATE/MACROCRYSTALS) 25; 75 MG/1; MG/1
100 CAPSULE ORAL
Qty: 10 | Refills: 0 | Status: ACTIVE | COMMUNITY
Start: 2024-02-05 | End: 1900-01-01

## 2024-02-05 RX ORDER — HYOSCYAMINE SULFATE, METHENAMINE, METHYLENE BLUE, PHENYL SALICYLATE, AND SODIUM PHOSPHATE, MONOBASIC, MONOHYDRATE .12; 81; 10.8; 32.4; 40.8 MG/1; MG/1; MG/1; MG/1; MG/1
81 TABLET ORAL
Qty: 12 | Refills: 0 | Status: ACTIVE | COMMUNITY
Start: 2024-02-05 | End: 1900-01-01

## 2024-02-06 ENCOUNTER — EMERGENCY (EMERGENCY)
Facility: HOSPITAL | Age: 29
LOS: 1 days | Discharge: HOME | End: 2024-02-06
Attending: STUDENT IN AN ORGANIZED HEALTH CARE EDUCATION/TRAINING PROGRAM
Payer: COMMERCIAL

## 2024-02-06 PROCEDURE — 99283 EMERGENCY DEPT VISIT LOW MDM: CPT

## 2024-02-06 PROCEDURE — 99282 EMERGENCY DEPT VISIT SF MDM: CPT

## 2024-02-06 NOTE — ED PROVIDER NOTE - CLINICAL SUMMARY MEDICAL DECISION MAKING FREE TEXT BOX
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 S/p recent course of PEP c/b status migrainosus. No other injury/stick. Hemdynam stable, NAD. No inc wob. Steady gait. declined PEP. Will complete occupational needlestick packet, dc.

## 2024-02-06 NOTE — ED ADULT NURSE NOTE - OBJECTIVE STATEMENT
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 pt denies any pain. no signs of distress at this time

## 2024-02-06 NOTE — ED PROVIDER NOTE - PATIENT PORTAL LINK FT
You can access the FollowMyHealth Patient Portal offered by [facility] by registering at the following website: [email or web address]. By joining [employer]’s FollowMyHealth portal, you will also be able to view your health information using other applications (apps) compatible with our system.

## 2024-02-06 NOTE — ED PROVIDER NOTE - NSFOLLOWUPINSTRUCTIONS_ED_ALL_ED_FT
YOU WERE SEEN IN THE ED FOR: needle stick injury    MONITOR FOR SIGNS AND SYMPTOMS OF INFECTION: pus, fevers    PLEASE FOLLOW UP WITH EMPLOYEE HEALTH within 1 week.    RETURN TO THE EMERGENCY DEPARTMENT IF YOU EXPERIENCE ANY NEW/CONCERNING/WORSENING SYMPTOMS.

## 2024-06-11 RX ORDER — HYOSCYAMINE SULFATE, METHENAMINE, METHYLENE BLUE, PHENYL SALICYLATE, AND SODIUM PHOSPHATE, MONOBASIC, MONOHYDRATE .12; 81; 10.8; 32.4; 40.8 MG/1; MG/1; MG/1; MG/1; MG/1
81 TABLET ORAL
Qty: 28 | Refills: 0 | Status: ACTIVE | COMMUNITY
Start: 2021-09-14 | End: 1900-01-01

## 2024-06-11 RX ORDER — NITROFURANTOIN (MONOHYDRATE/MACROCRYSTALS) 25; 75 MG/1; MG/1
100 CAPSULE ORAL
Qty: 14 | Refills: 0 | Status: ACTIVE | COMMUNITY
Start: 2021-09-14 | End: 1900-01-01

## 2024-06-25 RX ORDER — NORETHINDRONE ACETATE AND ETHINYL ESTRADIOL AND FERROUS FUMARATE 1.5-30(21)
1.5-3 KIT ORAL
Qty: 84 | Refills: 3 | Status: ACTIVE | COMMUNITY
Start: 2021-03-12 | End: 1900-01-01

## 2024-07-24 ENCOUNTER — APPOINTMENT (OUTPATIENT)
Dept: ORTHOPEDIC SURGERY | Facility: CLINIC | Age: 29
End: 2024-07-24

## 2024-08-21 ENCOUNTER — RX RENEWAL (OUTPATIENT)
Age: 29
End: 2024-08-21

## 2024-08-27 ENCOUNTER — RX RENEWAL (OUTPATIENT)
Age: 29
End: 2024-08-27

## 2024-08-28 ENCOUNTER — APPOINTMENT (OUTPATIENT)
Dept: OBGYN | Facility: CLINIC | Age: 29
End: 2024-08-28
Payer: COMMERCIAL

## 2024-08-28 VITALS
BODY MASS INDEX: 21.19 KG/M2 | SYSTOLIC BLOOD PRESSURE: 116 MMHG | DIASTOLIC BLOOD PRESSURE: 76 MMHG | WEIGHT: 135 LBS | HEIGHT: 67 IN

## 2024-08-28 DIAGNOSIS — Z01.419 ENCOUNTER FOR GYNECOLOGICAL EXAMINATION (GENERAL) (ROUTINE) W/OUT ABNORMAL FINDINGS: ICD-10-CM

## 2024-08-28 DIAGNOSIS — K59.00 CONSTIPATION, UNSPECIFIED: ICD-10-CM

## 2024-08-28 PROCEDURE — 99459 PELVIC EXAMINATION: CPT

## 2024-08-28 PROCEDURE — 99395 PREV VISIT EST AGE 18-39: CPT

## 2024-08-28 PROCEDURE — 36415 COLL VENOUS BLD VENIPUNCTURE: CPT

## 2024-08-28 RX ORDER — LEVONORGESTREL/ETHINYL ESTRADIOL 2.6; 2.3 MG/1; MG/1
120-30 PATCH TRANSDERMAL
Qty: 36 | Refills: 3 | Status: ACTIVE | COMMUNITY
Start: 2024-08-28 | End: 1900-01-01

## 2024-08-28 RX ORDER — SPIRONOLACTONE 100 MG/1
100 TABLET ORAL DAILY
Qty: 90 | Refills: 3 | Status: ACTIVE | COMMUNITY
Start: 2024-08-28 | End: 1900-01-01

## 2024-08-28 NOTE — HISTORY OF PRESENT ILLNESS
[Patient reported PAP Smear was normal] : Patient reported PAP Smear was normal [No] : Patient does not have concerns regarding sex [Currently Active] : currently active [FreeTextEntry1] : 08/28/2024. ELI DIANE 28 year old female G0 LMP 8/21/24. She presents for an annual gyn exam.  Pt is doing well. Regular menses. Admits to inconsistent pill taking due to her lifestyle and asks about other forms of contraception. Denies breakthrough bleeding, vaginal discharge and vaginitis sxs.   She reports chronic constipation is still persistent. 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 She has not followed with GI for evaluation. Denies supplement use. She also plans to do a critical care fellowship. Denies any recent UTIs.    Pt is interested in egg preservation sometime soon.   She is currently sexually active. Was with partner of 1 yr, desires STD testing. No current partner. Denies FHx of ovarian, uterine or colon cancer. No new medical conditions, medications or surgeries. No known drug allergies.  PMH: reflux, asthma, overactive bladder, ADHD, eczema, depression, chronic constipation FHx: PGM had recurrent breast ca (uncertain ages). Mother w/ elevated von willebrands - denies h/o blood clots. 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  [TextBox_4] : 08/22 YASH ruiz [PapSmeardate] : 09/23 [TextBox_31] : JOSE L

## 2024-08-28 NOTE — PLAN
[FreeTextEntry1] : 28 year old female pt presents for routine gyn exam Breast and pelvic exam performed Pap smear w/ Gc/Chl  conducted STI bloods and AMH drawn  Contraception: Pt reports noncomplaint with R/B/A reviewed for NuvaRing vs Twirla patch vs IUD Pt opts to try Twirla patch, Rx sent Rx sent for Spironolactone  Chronic constipation: Advised fiber supplements and Mg F/u with GI for possible colonoscopy, referral given  Egg Preservation: AMH drawn today Referred to PAULINA Barcenas  RTO in 1 year or PRN

## 2024-08-28 NOTE — PHYSICAL EXAM
[Chaperone Present] : A chaperone was present in the examining room during all aspects of the physical examination [52389] : A chaperone was present during the pelvic exam. [Appropriately responsive] : appropriately responsive [Alert] : alert [No Acute Distress] : no acute distress [No Lymphadenopathy] : no lymphadenopathy [Regular Rate Rhythm] : regular rate rhythm [No Murmurs] : no murmurs [Clear to Auscultation B/L] : clear to auscultation bilaterally [Soft] : soft [Non-tender] : non-tender [Non-distended] : non-distended [No HSM] : No HSM [No Lesions] : no lesions [No Mass] : no mass [Oriented x3] : oriented x3 [Examination Of The Breasts] : a normal appearance [] : implants [No Masses] : no breast masses were palpable [Labia Majora] : normal [Labia Minora] : normal [Normal] : normal [Uterine Adnexae] : normal [FreeTextEntry2] : [facility])

## 2024-08-29 LAB
25(OH)D3 SERPL-MCNC: 44.8 NG/ML
ANTI-MUELLERIAN HORMONE: 4.01 NG/ML
C TRACH RRNA SPEC QL NAA+PROBE: NOT DETECTED
HBV SURFACE AG SER QL: NONREACTIVE
HCV AB SER QL: NONREACTIVE
HCV S/CO RATIO: 0.05 S/CO
HIV1+2 AB SPEC QL IA.RAPID: NONREACTIVE
N GONORRHOEA RRNA SPEC QL NAA+PROBE: NOT DETECTED
SOURCE AMPLIFICATION: NORMAL
T PALLIDUM AB SER QL IA: NEGATIVE
T4 FREE SERPL-MCNC: 2.1 NG/DL
TSH SERPL-ACNC: 1.08 UIU/ML

## 2024-09-03 LAB — CYTOLOGY CVX/VAG DOC THIN PREP: NORMAL

## 2024-09-19 RX ORDER — NORELGESTROMIN AND ETHINLY ESTRADIOL 150; 35 UG/D; UG/D
150-35 PATCH TRANSDERMAL
Qty: 4 | Refills: 1 | Status: ACTIVE | COMMUNITY
Start: 2024-09-19

## 2024-09-19 RX ORDER — SPIRONOLACTONE 50 MG/1
50 TABLET ORAL
Qty: 90 | Refills: 3 | Status: ACTIVE | COMMUNITY
Start: 2024-09-19 | End: 1900-01-01

## 2024-10-21 NOTE — ED PROVIDER NOTE - OBJECTIVE STATEMENT
Fall Risk;
23 yo female with no PMHx p/w abdominal pain and diarrhea.  Patient reports that she developed lower abdominal cramping and diarrhea 3 days ago.  Patient reports that she is able to eat, but shortly after develops cramping abd pain and watery diarrhea.  Denies fevers/chills, CP, SOB, nausea/vomiting, sick contacts, recent travel or antibiotic use.  Tried pepto bismol with some relief.  Also endorsing sore throat.

## 2024-11-29 ENCOUNTER — RX RENEWAL (OUTPATIENT)
Age: 29
End: 2024-11-29

## 2025-02-24 NOTE — ED PROVIDER NOTE - CROS ED RESP ALL NEG
Detail Level: Detailed Add 36470 Cpt? (Important Note: In 2017 The Use Of 15525 Is Being Tracked By Cms To Determine Future Global Period Reimbursement For Global Periods): yes Wound Evaluated By (Optional): Brody Wound Diameter In Cm(Optional): 0 Wound Crusting?: clean Sutures?: intact Wound Edema?: mild Wound Color?: pink Wound Granulation?: early negative...

## 2025-05-16 ENCOUNTER — NON-APPOINTMENT (OUTPATIENT)
Age: 30
End: 2025-05-16

## 2025-08-01 ENCOUNTER — APPOINTMENT (OUTPATIENT)
Dept: HEMATOLOGY ONCOLOGY | Facility: CLINIC | Age: 30
End: 2025-08-01
Payer: COMMERCIAL

## 2025-08-01 PROCEDURE — 99204 OFFICE O/P NEW MOD 45 MIN: CPT | Mod: 95
